# Patient Record
Sex: MALE | Race: WHITE | Employment: OTHER | URBAN - METROPOLITAN AREA
[De-identification: names, ages, dates, MRNs, and addresses within clinical notes are randomized per-mention and may not be internally consistent; named-entity substitution may affect disease eponyms.]

---

## 2018-07-05 ENCOUNTER — ED HISTORICAL/CONVERTED ENCOUNTER (OUTPATIENT)
Dept: OTHER | Age: 53
End: 2018-07-05

## 2019-12-26 ENCOUNTER — ED HISTORICAL/CONVERTED ENCOUNTER (OUTPATIENT)
Dept: OTHER | Age: 54
End: 2019-12-26

## 2019-12-30 ENCOUNTER — OFFICE VISIT (OUTPATIENT)
Dept: FAMILY MEDICINE CLINIC | Age: 54
End: 2019-12-30

## 2019-12-30 ENCOUNTER — HOSPITAL ENCOUNTER (OUTPATIENT)
Dept: LAB | Age: 54
Discharge: HOME | End: 2019-12-30

## 2019-12-30 VITALS
TEMPERATURE: 98.2 F | WEIGHT: 202 LBS | DIASTOLIC BLOOD PRESSURE: 68 MMHG | RESPIRATION RATE: 18 BRPM | SYSTOLIC BLOOD PRESSURE: 114 MMHG | HEART RATE: 60 BPM | HEIGHT: 68 IN | OXYGEN SATURATION: 98 % | BODY MASS INDEX: 30.62 KG/M2

## 2019-12-30 DIAGNOSIS — M79.641 BILATERAL HAND PAIN: ICD-10-CM

## 2019-12-30 DIAGNOSIS — I10 ESSENTIAL HYPERTENSION: ICD-10-CM

## 2019-12-30 DIAGNOSIS — G25.81 RESTLESS LEG SYNDROME: ICD-10-CM

## 2019-12-30 DIAGNOSIS — E78.00 HYPERCHOLESTEROLEMIA: ICD-10-CM

## 2019-12-30 DIAGNOSIS — E11.40 TYPE 2 DIABETES MELLITUS WITH DIABETIC NEUROPATHY, WITHOUT LONG-TERM CURRENT USE OF INSULIN (HCC): ICD-10-CM

## 2019-12-30 DIAGNOSIS — E11.40 TYPE 2 DIABETES MELLITUS WITH DIABETIC NEUROPATHY, WITHOUT LONG-TERM CURRENT USE OF INSULIN (HCC): Primary | ICD-10-CM

## 2019-12-30 DIAGNOSIS — M79.642 BILATERAL HAND PAIN: ICD-10-CM

## 2019-12-30 DIAGNOSIS — J40 BRONCHITIS: ICD-10-CM

## 2019-12-30 LAB
ALBUMIN SERPL-MCNC: 3.4 G/DL (ref 3.5–5)
ALBUMIN/GLOB SERPL: 1 {RATIO} (ref 1.1–2.2)
ALP SERPL-CCNC: 88 U/L (ref 45–117)
ALT SERPL-CCNC: 27 U/L (ref 12–78)
ANION GAP SERPL CALC-SCNC: 3 MMOL/L (ref 5–15)
AST SERPL-CCNC: 16 U/L (ref 15–37)
BASOPHILS # BLD: 0 K/UL (ref 0–0.1)
BASOPHILS NFR BLD: 0 % (ref 0–1)
BILIRUB SERPL-MCNC: 0.5 MG/DL (ref 0.2–1)
BUN SERPL-MCNC: 11 MG/DL (ref 6–20)
BUN/CREAT SERPL: 17 (ref 12–20)
CALCIUM SERPL-MCNC: 8.5 MG/DL (ref 8.5–10.1)
CHLORIDE SERPL-SCNC: 101 MMOL/L (ref 97–108)
CHOLEST SERPL-MCNC: 102 MG/DL
CO2 SERPL-SCNC: 32 MMOL/L (ref 21–32)
CREAT SERPL-MCNC: 0.66 MG/DL (ref 0.7–1.3)
DIFFERENTIAL METHOD BLD: ABNORMAL
EOSINOPHIL # BLD: 0 K/UL (ref 0–0.4)
EOSINOPHIL NFR BLD: 0 % (ref 0–7)
ERYTHROCYTE [DISTWIDTH] IN BLOOD BY AUTOMATED COUNT: 12.4 % (ref 11.5–14.5)
EST. AVERAGE GLUCOSE BLD GHB EST-MCNC: 160 MG/DL
GLOBULIN SER CALC-MCNC: 3.3 G/DL (ref 2–4)
GLUCOSE SERPL-MCNC: 213 MG/DL (ref 65–100)
HBA1C MFR BLD: 7.2 % (ref 4–5.6)
HCT VFR BLD AUTO: 37.6 % (ref 36.6–50.3)
HDLC SERPL-MCNC: 38 MG/DL
HDLC SERPL: 2.7 {RATIO} (ref 0–5)
HGB BLD-MCNC: 12.6 G/DL (ref 12.1–17)
IMM GRANULOCYTES # BLD AUTO: 0.1 K/UL (ref 0–0.04)
IMM GRANULOCYTES NFR BLD AUTO: 1 % (ref 0–0.5)
LDLC SERPL CALC-MCNC: 41.4 MG/DL (ref 0–100)
LIPID PROFILE,FLP: NORMAL
LYMPHOCYTES # BLD: 2.3 K/UL (ref 0.8–3.5)
LYMPHOCYTES NFR BLD: 13 % (ref 12–49)
MCH RBC QN AUTO: 30.9 PG (ref 26–34)
MCHC RBC AUTO-ENTMCNC: 33.5 G/DL (ref 30–36.5)
MCV RBC AUTO: 92.2 FL (ref 80–99)
MONOCYTES # BLD: 1.4 K/UL (ref 0–1)
MONOCYTES NFR BLD: 8 % (ref 5–13)
NEUTS SEG # BLD: 13.4 K/UL (ref 1.8–8)
NEUTS SEG NFR BLD: 78 % (ref 32–75)
NRBC # BLD: 0 K/UL (ref 0–0.01)
NRBC BLD-RTO: 0 PER 100 WBC
PLATELET # BLD AUTO: 265 K/UL (ref 150–400)
PMV BLD AUTO: 9.4 FL (ref 8.9–12.9)
POTASSIUM SERPL-SCNC: 3.5 MMOL/L (ref 3.5–5.1)
PROT SERPL-MCNC: 6.7 G/DL (ref 6.4–8.2)
RBC # BLD AUTO: 4.08 M/UL (ref 4.1–5.7)
SODIUM SERPL-SCNC: 136 MMOL/L (ref 136–145)
TRIGL SERPL-MCNC: 113 MG/DL (ref ?–150)
VLDLC SERPL CALC-MCNC: 22.6 MG/DL
WBC # BLD AUTO: 17.3 K/UL (ref 4.1–11.1)

## 2019-12-30 RX ORDER — PREDNISONE 20 MG/1
TABLET ORAL
COMMUNITY
Start: 2019-12-26 | End: 2020-02-11

## 2019-12-30 RX ORDER — IBUPROFEN 800 MG/1
TABLET ORAL
COMMUNITY
Start: 2019-10-04 | End: 2019-12-30 | Stop reason: ALTCHOICE

## 2019-12-30 RX ORDER — METOPROLOL TARTRATE 100 MG/1
TABLET ORAL
COMMUNITY
Start: 2019-10-25 | End: 2021-01-22

## 2019-12-30 RX ORDER — OMEPRAZOLE 20 MG/1
CAPSULE, DELAYED RELEASE ORAL
COMMUNITY
Start: 2019-12-13 | End: 2020-02-13 | Stop reason: DRUGHIGH

## 2019-12-30 RX ORDER — BUPRENORPHINE AND NALOXONE 8; 2 MG/1; MG/1
FILM, SOLUBLE BUCCAL; SUBLINGUAL 3 TIMES DAILY
COMMUNITY
Start: 2019-11-19

## 2019-12-30 RX ORDER — DOCUSATE SODIUM 100 MG/1
CAPSULE, LIQUID FILLED ORAL
COMMUNITY
Start: 2019-11-19 | End: 2020-10-27

## 2019-12-30 RX ORDER — GABAPENTIN 800 MG/1
800 TABLET ORAL
COMMUNITY
Start: 2019-11-19 | End: 2020-03-31 | Stop reason: SDUPTHER

## 2019-12-30 RX ORDER — ROSUVASTATIN CALCIUM 5 MG/1
5 TABLET, COATED ORAL
COMMUNITY
Start: 2019-09-18 | End: 2021-01-22

## 2019-12-30 RX ORDER — ALBUTEROL SULFATE 90 UG/1
AEROSOL, METERED RESPIRATORY (INHALATION)
COMMUNITY
Start: 2019-12-26 | End: 2021-06-30

## 2019-12-30 RX ORDER — DOXYCYCLINE 100 MG/1
CAPSULE ORAL
COMMUNITY
Start: 2019-12-27 | End: 2020-02-11

## 2019-12-30 RX ORDER — HYDROCHLOROTHIAZIDE 12.5 MG/1
12.5 CAPSULE ORAL
COMMUNITY
Start: 2019-10-25 | End: 2021-01-22

## 2019-12-30 RX ORDER — AMOXICILLIN 250 MG
CAPSULE ORAL
COMMUNITY
Start: 2019-10-25 | End: 2021-09-29

## 2019-12-30 RX ORDER — MELOXICAM 7.5 MG/1
7.5-15 TABLET ORAL DAILY
Qty: 60 TAB | Refills: 2 | Status: SHIPPED | OUTPATIENT
Start: 2019-12-30 | End: 2020-02-11

## 2019-12-30 RX ORDER — METFORMIN HYDROCHLORIDE 500 MG/1
500 TABLET ORAL
COMMUNITY
Start: 2019-11-19 | End: 2020-01-30

## 2019-12-30 NOTE — PROGRESS NOTES
Chief Complaint   Patient presents with   [address]     Patient in office today to est care. Last meal was last night. Pt previous est care Dr.Jolanta A. [place] Phalen    Pt has a long history of RLS. Has completed sleep studies in the past.   Pt somehow ended up on suboxone while in [facility] which was the only thing that helped him. Eventually after being released from [facility] pt was prescribed percocet for his RLS which helped him. His PCP changed and the new provider refused to prescribe him percocet. Pt eventually went to a suboxone clinic. Only thing that has helped his RLS at night. Pt had to go to therapy once a week and sees the doctor every 2-3 weeks. Getting a good night sleep with use of suboxone before bedtime. Has not had his sugar checked in the last few months. Has had a cold on and off for the last few weeks. Recent use of abx for his cough and congestion. Just had an MRI done on right shoulder. Going to have EMG testing done in left hand. Waking up with stiffness in the hands. Just started using blue emu which has helped so far. This was also ordered by same ortho doc. Taking neurontin BID daily for his neuropathy. Denies any other concerns at this time. Chief Complaint   Patient presents with   [address]     he is a 47y.o. year old male who presents for evalution. Reviewed PmHx, RxHx, FmHx, SocHx, AllgHx and updated and dated in the chart.     Review of Systems - negative except as listed above in the HPI    Objective:     Vitals:    12/30/19 1444   BP: 114/68   Pulse: 60   Resp: 18   Temp: 98.2 °F (36.8 °C)   TempSrc: Oral   SpO2: 98%   Weight: 202 lb (91.6 kg)   Height: 5' 8\" (1.727 m)     Physical Examination: General appearance - alert, well appearing, and in no distress  Mental status - normal mood, behavior, speech, dress, motor activity, and thought processes  Eyes - pupils equal and reactive, extraocular eye movements intact  Ears - bilateral TM's and external ear canals normal  Nose - normal and patent, no erythema, discharge or polyps and normal nontender sinuses  Mouth - mucous membranes moist, pharynx normal without lesions  Neck - supple, no significant adenopathy, carotids upstroke normal bilaterally, no bruits, thyroid exam: thyroid is normal in size without nodules or tenderness  Chest - clear to auscultation, no wheezes, rales or rhonchi, symmetric air entry  Heart - normal rate, regular rhythm, normal S1, S2, no murmurs  Extremities - peripheral pulses normal, no edema, no clubbing or cyanosis  Skin - normal coloration and turgor, no rashes, no suspicious skin lesions noted    Assessment/ Plan:   Diagnoses and all orders for this visit:    1. Type 2 diabetes mellitus with diabetic neuropathy, without long-term current use of insulin (HCC)  -     HEMOGLOBIN A1C WITH EAG; Future  Will notify results and deviate plan based on findings. 2. Essential hypertension  -     METABOLIC PANEL, COMPREHENSIVE; Future  -     CBC WITH AUTOMATED DIFF; Future  BP at goal on med regimen. 3. Hypercholesterolemia  -     LIPID PANEL; Future  Will notify results and deviate plan based on findings. Continue crestor daily as prescribed. 4. Restless leg syndrome  Doing well on suboxone for this. Will need to continue to see specialist.   5. Bronchitis  Complete doxy and prednisone as directed. Recently started meds. 6. Bilateral hand pain  -     meloxicam (MOBIC) 7.5 mg tablet; Take 1-2 Tabs by mouth daily. Start mobic as directed. Reviewed SEs/ADRs of medication. Reviewed other supportive measures. Follow-up and Dispositions    · Return if symptoms worsen or fail to improve. I have discussed the diagnosis with the patient and the intended plan as seen in the above orders. The patient has received an after-visit summary and questions were answered concerning future plans.      Medication Side Effects and Warnings were discussed with patient: yes  Patient Labs were reviewed and or requested: yes  Patient Past Records were reviewed and or requested  yes  Patient / Caregiver Understanding of treatment plan was verbalized during office visit YES    TU Bunn    There are no Patient Instructions on file for this visit.

## 2019-12-30 NOTE — PROGRESS NOTES
Chief Complaint   Patient presents with   [address]     Patient in office today to est care. Pt previous est care Dr.Jolanta CLAIR Khan      Pt have no concerns. 1. Have you been to the ER, urgent care clinic since your last visit? Hospitalized since your last visit? No    2. Have you seen or consulted any other health care providers outside of the [address] [place] [address] since your last visit? Include any pap smears or colon screening.  No

## 2020-01-02 ENCOUNTER — TELEPHONE (OUTPATIENT)
Dept: FAMILY MEDICINE CLINIC | Age: 55
End: 2020-01-02

## 2020-01-02 NOTE — PROGRESS NOTES
Please notify pt the followin. Diabetes pretty well controlled. Enc to continue med regimen and follow up every 3 months for routine lab work to monitor closely. 2. His WBC count is very elevated which is likely due to his bad bronchitis. Enc pt to complete entire course of abx prescribed by urgent care. I recommend he follow up in 2 weeks to recheck CBC to make sure we see improvement after finishing meds.

## 2020-01-30 ENCOUNTER — TELEPHONE (OUTPATIENT)
Dept: FAMILY MEDICINE CLINIC | Age: 55
End: 2020-01-30

## 2020-01-30 DIAGNOSIS — E11.40 TYPE 2 DIABETES MELLITUS WITH DIABETIC NEUROPATHY, WITHOUT LONG-TERM CURRENT USE OF INSULIN (HCC): Primary | ICD-10-CM

## 2020-01-30 RX ORDER — METFORMIN HYDROCHLORIDE 500 MG/1
1000 TABLET ORAL 2 TIMES DAILY WITH MEALS
Qty: 120 TAB | Refills: 5 | Status: SHIPPED | OUTPATIENT
Start: 2020-01-30 | End: 2020-03-31 | Stop reason: SDUPTHER

## 2020-01-30 NOTE — TELEPHONE ENCOUNTER
Patient would like a refill on metformin 500mg that has not been refilled here yet. He has been taking 4 tabs a day instead of 2 per previous pcp.

## 2020-02-11 ENCOUNTER — OFFICE VISIT (OUTPATIENT)
Dept: FAMILY MEDICINE CLINIC | Age: 55
End: 2020-02-11

## 2020-02-11 ENCOUNTER — DOCUMENTATION ONLY (OUTPATIENT)
Dept: FAMILY MEDICINE CLINIC | Age: 55
End: 2020-02-11

## 2020-02-11 ENCOUNTER — HOSPITAL ENCOUNTER (OUTPATIENT)
Dept: LAB | Age: 55
Discharge: HOME | End: 2020-02-11

## 2020-02-11 VITALS
SYSTOLIC BLOOD PRESSURE: 126 MMHG | TEMPERATURE: 97.6 F | RESPIRATION RATE: 18 BRPM | HEIGHT: 68 IN | BODY MASS INDEX: 31.22 KG/M2 | HEART RATE: 60 BPM | OXYGEN SATURATION: 95 % | WEIGHT: 206 LBS | DIASTOLIC BLOOD PRESSURE: 78 MMHG

## 2020-02-11 DIAGNOSIS — D72.829 LEUKOCYTOSIS, UNSPECIFIED TYPE: Primary | ICD-10-CM

## 2020-02-11 DIAGNOSIS — M79.642 BILATERAL HAND PAIN: ICD-10-CM

## 2020-02-11 DIAGNOSIS — M79.641 BILATERAL HAND PAIN: ICD-10-CM

## 2020-02-11 DIAGNOSIS — D72.829 LEUKOCYTOSIS, UNSPECIFIED TYPE: ICD-10-CM

## 2020-02-11 DIAGNOSIS — M15.9 OSTEOARTHRITIS INVOLVING MULTIPLE JOINTS ON BOTH SIDES OF BODY: ICD-10-CM

## 2020-02-11 LAB
BASOPHILS # BLD: 0.1 K/UL (ref 0–0.1)
BASOPHILS NFR BLD: 1 % (ref 0–1)
DIFFERENTIAL METHOD BLD: ABNORMAL
EOSINOPHIL # BLD: 0.6 K/UL (ref 0–0.4)
EOSINOPHIL NFR BLD: 8 % (ref 0–7)
ERYTHROCYTE [DISTWIDTH] IN BLOOD BY AUTOMATED COUNT: 13.6 % (ref 11.5–14.5)
HCT VFR BLD AUTO: 41 % (ref 36.6–50.3)
HGB BLD-MCNC: 13.4 G/DL (ref 12.1–17)
IMM GRANULOCYTES # BLD AUTO: 0 K/UL (ref 0–0.04)
IMM GRANULOCYTES NFR BLD AUTO: 0 % (ref 0–0.5)
LYMPHOCYTES # BLD: 1.7 K/UL (ref 0.8–3.5)
LYMPHOCYTES NFR BLD: 21 % (ref 12–49)
MCH RBC QN AUTO: 31.2 PG (ref 26–34)
MCHC RBC AUTO-ENTMCNC: 32.7 G/DL (ref 30–36.5)
MCV RBC AUTO: 95.3 FL (ref 80–99)
MONOCYTES # BLD: 1 K/UL (ref 0–1)
MONOCYTES NFR BLD: 12 % (ref 5–13)
NEUTS SEG # BLD: 4.8 K/UL (ref 1.8–8)
NEUTS SEG NFR BLD: 58 % (ref 32–75)
NRBC # BLD: 0 K/UL (ref 0–0.01)
NRBC BLD-RTO: 0 PER 100 WBC
PLATELET # BLD AUTO: 187 K/UL (ref 150–400)
PMV BLD AUTO: 10.4 FL (ref 8.9–12.9)
RBC # BLD AUTO: 4.3 M/UL (ref 4.1–5.7)
WBC # BLD AUTO: 8.1 K/UL (ref 4.1–11.1)

## 2020-02-11 RX ORDER — MELOXICAM 15 MG/1
15 TABLET ORAL DAILY
Qty: 90 TAB | Refills: 1 | Status: SHIPPED | OUTPATIENT
Start: 2020-02-11 | End: 2020-03-31 | Stop reason: ALTCHOICE

## 2020-02-11 NOTE — PROGRESS NOTES
Sherif Dukes [facility] @ [phone]. Confirmation number [id number]. Original form placed in scan folder for central scanning.

## 2020-02-11 NOTE — PROGRESS NOTES
Chief Complaint   Patient presents with    Labs    Hand Pain     Patient in office today for recheck on cbc. Pt have c/o of hand pain that has not improved since lov. Pt states mobic has slightly improved pain. Pt states middle,index,and pointer are the worse fingers. Pt have noted limited ROM and dropping objects. Pt states pain is worse in the morning. Pt denies family hx of arthritis. 1. Have you been to the ER, urgent care clinic since your last visit? Hospitalized since your last visit? No    2. Have you seen or consulted any other health care providers outside of the [facility] since your last visit? Include any pap smears or colon screening.  No

## 2020-02-11 NOTE — PROGRESS NOTES
Chief Complaint   Patient presents with    Labs    Hand Pain     Patient in office today for recheck on cbc. Pt have c/o of hand pain that has not improved since lov. Pt states mobic has slightly improved pain. Pt states middle,index,and pointer are the worse fingers. Pt have noted limited ROM and dropping objects. Pt states pain is worse in the morning. Stiffness also worse in the morning. Has been taking the mobic 2 per day every day. Was using blue emu due to his dog biting him and having an open sore. Pt denies family hx of arthritis. Denies any other concerns at this time. Chief Complaint   Patient presents with    Labs    Hand Pain     he is a 47y.o. year old male who presents for evalution. Reviewed PmHx, RxHx, FmHx, SocHx, AllgHx and updated and dated in the chart. Review of Systems - negative except as listed above in the HPI    Objective:     Vitals:    02/11/20 1128   BP: 126/78   Pulse: 60   Resp: 18   Temp: 97.6 °F (36.4 °C)   TempSrc: Oral   SpO2: 95%   Weight: 206 lb (93.4 kg)   Height: 5' 8\" (1.727 m)     Physical Examination: General appearance - alert, well appearing, and in no distress  Mental status - normal mood, behavior, speech, dress, motor activity, and thought processes  Chest - clear to auscultation, no wheezes, rales or rhonchi, symmetric air entry  Heart - normal rate, regular rhythm, normal S1, S2, no murmurs  Musculoskeletal - osteoarthritic changes noted in both hands    Assessment/ Plan:   Diagnoses and all orders for this visit:    1. Leukocytosis, unspecified type  -     CBC WITH AUTOMATED DIFF; Future  Will notify results and deviate plan based on findings. Denies any persistent sx after completing abx.   2. Bilateral hand pain / 3. Osteoarthritis involving multiple joints on both sides of body  -     REFERRAL TO ORTHOPEDICS  -     meloxicam (MOBIC) 15 mg tablet;  Take 1 Tab by mouth daily.  -     OTHER; Diclofenac / Gabapentin in Lidocaine / Prilocaine 2.5%/2.5%/2.6% cream.  Use as directed. Continue mobic daily. Reviewed other supportive and conservative measures. Enc pt to consider seeing ortho for further evaluation if sx persist or worsen. I have discussed the diagnosis with the patient and the intended plan as seen in the above orders. The patient has received an after-visit summary and questions were answered concerning future plans. Medication Side Effects and Warnings were discussed with patient: yes  Patient Labs were reviewed and or requested: yes  Patient Past Records were reviewed and or requested  yes  Patient / Caregiver Understanding of treatment plan was verbalized during office visit YES    TU Garcia    There are no Patient Instructions on file for this visit.

## 2020-02-12 NOTE — PROGRESS NOTES
Please notify pt that his WBC count has normalized. No evidence of persistent infection and no additional work up indicated at this time.

## 2020-02-13 ENCOUNTER — TELEPHONE (OUTPATIENT)
Dept: FAMILY MEDICINE CLINIC | Age: 55
End: 2020-02-13

## 2020-02-13 RX ORDER — PHENOL/SODIUM PHENOLATE
20 AEROSOL, SPRAY (ML) MUCOUS MEMBRANE 2 TIMES DAILY
Qty: 60 TAB | Refills: 5 | Status: SHIPPED | OUTPATIENT
Start: 2020-02-13 | End: 2020-09-03 | Stop reason: SDUPTHER

## 2020-02-13 NOTE — TELEPHONE ENCOUNTER
Please refer to result note. Pt stated he wanted to notify provider that he taking omeprazole 20mg bid.

## 2020-03-31 ENCOUNTER — VIRTUAL VISIT (OUTPATIENT)
Dept: FAMILY MEDICINE CLINIC | Age: 55
End: 2020-03-31

## 2020-03-31 DIAGNOSIS — G56.00 CARPAL TUNNEL SYNDROME, UNSPECIFIED LATERALITY: ICD-10-CM

## 2020-03-31 DIAGNOSIS — M19.041 OSTEOARTHRITIS OF BOTH HANDS, UNSPECIFIED OSTEOARTHRITIS TYPE: Primary | ICD-10-CM

## 2020-03-31 DIAGNOSIS — E11.40 TYPE 2 DIABETES MELLITUS WITH DIABETIC NEUROPATHY, WITHOUT LONG-TERM CURRENT USE OF INSULIN (HCC): ICD-10-CM

## 2020-03-31 DIAGNOSIS — I10 ESSENTIAL HYPERTENSION: ICD-10-CM

## 2020-03-31 DIAGNOSIS — M19.042 OSTEOARTHRITIS OF BOTH HANDS, UNSPECIFIED OSTEOARTHRITIS TYPE: Primary | ICD-10-CM

## 2020-03-31 DIAGNOSIS — E78.00 HYPERCHOLESTEROLEMIA: ICD-10-CM

## 2020-03-31 RX ORDER — GABAPENTIN 800 MG/1
800 TABLET ORAL 3 TIMES DAILY
Qty: 90 TAB | Refills: 3 | Status: SHIPPED | OUTPATIENT
Start: 2020-03-31 | End: 2020-08-14

## 2020-03-31 RX ORDER — CELECOXIB 200 MG/1
200 CAPSULE ORAL DAILY
Qty: 30 CAP | Refills: 2 | Status: SHIPPED | OUTPATIENT
Start: 2020-03-31 | End: 2020-07-10

## 2020-03-31 RX ORDER — METFORMIN HYDROCHLORIDE 500 MG/1
1000 TABLET ORAL 2 TIMES DAILY WITH MEALS
Qty: 120 TAB | Refills: 5 | Status: SHIPPED | OUTPATIENT
Start: 2020-03-31 | End: 2020-12-28 | Stop reason: SDUPTHER

## 2020-03-31 NOTE — PROGRESS NOTES
Glenda Gil is a 47 y.o. male who was seen by synchronous (real-time) audio-video technology on 3/31/2020. Consent:  He and/or his healthcare decision maker is aware that this patient-initiated Telehealth encounter is a billable service, with coverage as determined by his insurance carrier. He is aware that he may receive a bill and has provided verbal consent to proceed: Yes    I was in the office while conducting this encounter. Pt reports persistent bilateral hand pain. Pt was found to have CTS and surgery scheduled on 4/15 with Dr. Dorn Olszewski was postponed due to covid19 pandemic. Has been taking mobic daily without relief. Persistent stiffness and pain of the hands, francy first thing in the morning. Using cream that was prescribed through compound[facility] pharmacy [facility] which has helped. Taking the 800 mg gabapentin in the morning only. Has not tried increasing frequency. Would like to try alt medication for pain and stiffness. Pt also due to update his a1c. Taking all medications daily as prescribed. Willing to RTC for fasting labs only. Denies any other concerns at this time. Assessment & Plan:   Diagnoses and all orders for this visit:    1. Osteoarthritis of both hands, unspecified osteoarthritis type  -     celecoxib (CELEBREX) 200 mg capsule; Take 1 Cap by mouth daily for 90 days. Stop mobic. Start celebrex daily as prescribed. Reviewed SEs/ADRs of medication. Continue prilosec daily due to history of GERD. Continue to follow up with specialist as advised. 2. Carpal tunnel syndrome, unspecified laterality  -     gabapentin (NEURONTIN) 800 mg tablet; Take 1 Tab by mouth three (3) times daily. Max Daily Amount: 2,400 mg. Recommended pt try to increase to BID dosing and take second dose at bedtime to help with morning sx. Okay to increase to TID dosing if needed. Reinforced SEs/ADRs of medication.    3. Essential hypertension  BP was normal last 2 OVs. Continue current med regimen as prescribed. 4. Hypercholesterolemia  -     LIPID PANEL; Future  Will notify results and deviate plan based on findings. Continue crestor daily as prescribed. 5. Type 2 diabetes mellitus with diabetic neuropathy, without long-term current use of insulin (HCC)  -     HEMOGLOBIN A1C WITH EAG; Future  -     METABOLIC PANEL, COMPREHENSIVE; Future  -     CBC WITH AUTOMATED DIFF; Future  Will notify results and deviate plan based on findings. Last a1c was 7.2 in December. Coding Help - Use CPT Codes 27615-98466, 04150-41709 for Established and New Patients respectively, either employing EM elements or Time rules. Other codes (example consult codes) may also apply. I spent at least 15 minutes with this established patient, and >50% of the time was spent counseling and/or coordinating care regarding hand pain and diabetes. 712  Subjective:   [address] was seen for No chief complaint on file. Prior to Admission medications    Medication Sig Start Date End Date Taking? Authorizing Provider   gabapentin (NEURONTIN) 800 mg tablet Take 1 Tab by mouth three (3) times daily. Max Daily Amount: 2,400 mg. 3/31/20  Yes Ginna Lau NP   celecoxib (CELEBREX) 200 mg capsule Take 1 Cap by mouth daily for 90 days. 3/31/20 6/29/20 Yes Ginna Lau NP   Omeprazole delayed release (PRILOSEC D/R) 20 mg tablet Take 1 Tab by mouth two (2) times a day. 2/13/20   Ginna Lau NP   OTHER Diclofenac / Gabapentin in Lidocaine / Prilocaine 2.5%/2.5%/2.6% cream. 2/11/20   Ginna Lau NP   meloxicam (MOBIC) 15 mg tablet Take 1 Tab by mouth daily. 2/11/20 3/31/20  Ginna Lau NP   metFORMIN (GLUCOPHAGE) 500 mg tablet Take 2 Tabs by mouth two (2) times daily (with meals).  1/30/20   Ginna Lau NP   albuterol (PROVENTIL HFA, VENTOLIN HFA, PROAIR HFA) 90 mcg/actuation inhaler INHALE 1 PUFF BY MOUTH EVERY 4 HOURS AS NEEDED FOR WHEEZING 12/26/19   Provider, Historical   buprenorphine-naloxone (SUBOXONE) 8-2 mg film sublingaul film dissolve 1 film under the tongue TWICE DAILY 11/19/19   Provider, Historical   docusate sodium (DOK) 100 mg capsule TAKE ONE CAPSULE BY MOUTH ONCE DAILY AS NEEDED 11/19/19   Provider, Historical   hydroCHLOROthiazide (MICROZIDE) 12.5 mg capsule Take 12.5 mg by mouth. 10/25/19   Provider, Historical   metoprolol tartrate (LOPRESSOR) 100 mg IR tablet TAKE ONE TABLET BY MOUTH EVERY DAY 10/25/19   Provider, Historical   rosuvastatin (CRESTOR) 5 mg tablet Take 5 mg by mouth. 9/18/19   Provider, Historical   senna-docusate (PERICOLACE) 8.6-50 mg per tablet take 2 tablets BY MOUTH EVERY EVENING AS NEEDED 10/25/19   Provider, Historical   gabapentin (NEURONTIN) 800 mg tablet Take 800 mg by mouth. 11/19/19 3/31/20  Provider, Historical     Allergies   Allergen Reactions    Aspirin Other (comments)     GI Upset    Pcn [Penicillins] Unknown (comments)     Family reported. Patient Active Problem List   Diagnosis Code    Restless leg syndrome G25.81    Hypertension I10    Hypercholesterolemia E78.00    Type 2 diabetes mellitus with diabetic neuropathy (HCC) E11.40    GERD (gastroesophageal reflux disease) K21.9     Patient Active Problem List    Diagnosis Date Noted    Restless leg syndrome     Hypertension     Hypercholesterolemia     Type 2 diabetes mellitus with diabetic neuropathy (HCC)     GERD (gastroesophageal reflux disease)      Current Outpatient Medications   Medication Sig Dispense Refill    gabapentin (NEURONTIN) 800 mg tablet Take 1 Tab by mouth three (3) times daily. Max Daily Amount: 2,400 mg. 90 Tab 3    celecoxib (CELEBREX) 200 mg capsule Take 1 Cap by mouth daily for 90 days. 30 Cap 2    Omeprazole delayed release (PRILOSEC D/R) 20 mg tablet Take 1 Tab by mouth two (2) times a day.  60 Tab 5    OTHER Diclofenac / Gabapentin in Lidocaine / Prilocaine 2.5%/2.5%/2.6% cream. 240 g 3    metFORMIN (GLUCOPHAGE) 500 mg tablet Take 2 Tabs by mouth two (2) times daily (with meals). 120 Tab 5    albuterol (PROVENTIL HFA, VENTOLIN HFA, PROAIR HFA) 90 mcg/actuation inhaler INHALE 1 PUFF BY MOUTH EVERY 4 HOURS AS NEEDED FOR WHEEZING      buprenorphine-naloxone (SUBOXONE) 8-2 mg film sublingaul film dissolve 1 film under the tongue TWICE DAILY      docusate sodium (DOK) 100 mg capsule TAKE ONE CAPSULE BY MOUTH ONCE DAILY AS NEEDED      hydroCHLOROthiazide (MICROZIDE) 12.5 mg capsule Take 12.5 mg by mouth.  metoprolol tartrate (LOPRESSOR) 100 mg IR tablet TAKE ONE TABLET BY MOUTH EVERY DAY      rosuvastatin (CRESTOR) 5 mg tablet Take 5 mg by mouth.  senna-docusate (PERICOLACE) 8.6-50 mg per tablet take 2 tablets BY MOUTH EVERY EVENING AS NEEDED       Allergies   Allergen Reactions    Aspirin Other (comments)     GI Upset    Pcn [Penicillins] Unknown (comments)     Family reported. Past Medical History:   Diagnosis Date    Acid reflux     Diabetes ([facility] [address]     Diabetic neuropathy (HCC)     Hypercholesterolemia     Hypertension     Restless leg syndrome      No past surgical history on file. Family History   Problem Relation Age of Onset    Diabetes Mother     Heart Disease Mother     Heart Disease Father      Social History     Tobacco Use    Smoking status: Current Every Day Smoker     Packs/day: 1.00    Smokeless tobacco: Never Used   Substance Use Topics    Alcohol use: Not Currently       ROS    PHYSICAL EXAMINATION:  [ INSTRUCTIONS:  \"[x]\" Indicates a positive item  \"[]\" Indicates a negative item  -- DELETE ALL ITEMS NOT EXAMINED]  Vital Signs: (As obtained by patient/caregiver at home)  There were no vitals taken for this visit.      Constitutional: [x] Appears well-developed and well-nourished [x] No apparent distress      [] Abnormal -     Mental status: [x] Alert and awake  [x] Oriented to person/place/time [x] Able to follow commands    [] Abnormal -     Eyes:   EOM    [x]  Normal    [] Abnormal -   Sclera  [x]  Normal    [] Abnormal -          Discharge [x]  None visible   [] Abnormal -     HENT: [x] Normocephalic, atraumatic  [] Abnormal -   [x] Mouth/Throat: Mucous membranes are moist    External Ears [x] Normal  [] Abnormal -    Neck: [x] No visualized mass [] Abnormal -     Pulmonary/Chest: [x] Respiratory effort normal   [x] No visualized signs of difficulty breathing or respiratory distress        [] Abnormal -      Musculoskeletal:   [x] Normal gait with no signs of ataxia         [x] Normal range of motion of neck        [] Abnormal -     Neurological:        [x] No Facial Asymmetry (Cranial nerve 7 motor function) (limited exam due to video visit)          [x] No gaze palsy        [] Abnormal -          Skin:        [x] No significant exanthematous lesions or discoloration noted on facial skin         [] Abnormal -            Psychiatric:       [x] Normal Affect [] Abnormal -        [x] No Hallucinations    Other pertinent observable physical exam findings:-        We discussed the expected course, resolution and complications of the diagnosis(es) in detail. Medication risks, benefits, costs, interactions, and alternatives were discussed as indicated. I advised him to contact the office if his condition worsens, changes or fails to improve as anticipated. He expressed understanding with the diagnosis(es) and plan. Pursuant to the emergency declaration under the [facility], [facility] waiver authority and the [employer] Act, this Virtual  Visit was conducted, with patient's consent, to reduce the patient's risk of exposure to COVID-19 and provide continuity of care for an established patient. Services were provided through a video synchronous discussion virtually to substitute for in-person clinic visit.     Gunnar Hernandez NP

## 2020-04-08 ENCOUNTER — OFFICE VISIT (OUTPATIENT)
Dept: PRIMARY CARE CLINIC | Age: 55
End: 2020-04-08

## 2020-04-08 VITALS — TEMPERATURE: 98.6 F | RESPIRATION RATE: 22 BRPM | OXYGEN SATURATION: 90 % | HEART RATE: 75 BPM

## 2020-04-08 DIAGNOSIS — R05.9 COUGH: ICD-10-CM

## 2020-04-08 DIAGNOSIS — J40 BRONCHITIS: Primary | ICD-10-CM

## 2020-04-08 DIAGNOSIS — R50.9 FEBRILE ILLNESS: ICD-10-CM

## 2020-04-08 LAB
FLUAV+FLUBV AG NOSE QL IA.RAPID: NEGATIVE POS/NEG
FLUAV+FLUBV AG NOSE QL IA.RAPID: NEGATIVE POS/NEG
VALID INTERNAL CONTROL?: YES

## 2020-04-08 RX ORDER — GUAIFENESIN 600 MG/1
600 TABLET, EXTENDED RELEASE ORAL
Qty: 30 TAB | Refills: 0 | Status: SHIPPED | OUTPATIENT
Start: 2020-04-08 | End: 2021-01-22

## 2020-04-08 RX ORDER — CEFDINIR 300 MG/1
300 CAPSULE ORAL 2 TIMES DAILY
Qty: 20 CAP | Refills: 0 | Status: SHIPPED | OUTPATIENT
Start: 2020-04-08 | End: 2020-04-18

## 2020-06-02 ENCOUNTER — VIRTUAL VISIT (OUTPATIENT)
Dept: FAMILY MEDICINE CLINIC | Age: 55
End: 2020-06-02

## 2020-06-02 DIAGNOSIS — G56.00 CARPAL TUNNEL SYNDROME, UNSPECIFIED LATERALITY: ICD-10-CM

## 2020-06-02 DIAGNOSIS — I10 ESSENTIAL HYPERTENSION: ICD-10-CM

## 2020-06-02 DIAGNOSIS — E78.00 HYPERCHOLESTEROLEMIA: ICD-10-CM

## 2020-06-02 DIAGNOSIS — M13.0 POLYARTHRITIS: ICD-10-CM

## 2020-06-02 DIAGNOSIS — E11.40 TYPE 2 DIABETES MELLITUS WITH DIABETIC NEUROPATHY, WITHOUT LONG-TERM CURRENT USE OF INSULIN (HCC): Primary | ICD-10-CM
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
## 2020-06-02 DIAGNOSIS — S61.451A DOG BITE, HAND, RIGHT, INITIAL ENCOUNTER: ICD-10-CM

## 2020-06-02 RX ORDER — DOXYCYCLINE 100 MG/1
100 TABLET ORAL 2 TIMES DAILY
Qty: 20 TAB | Refills: 0 | Status: SHIPPED | OUTPATIENT
Start: 2020-06-02 | End: 2020-06-12

## 2020-06-02 NOTE — PROGRESS NOTES
Charisse Elizabeth is a 47 y.o. male who was seen by synchronous (real-time) audio-video technology on 6/2/2020. Consent: Charisse Fang, who was seen by synchronous (real-time) audio-video technology, and/or his healthcare decision maker, is aware that this patient-initiated, Telehealth encounter on 6/2/2020 is a billable service, with coverage as determined by his insurance carrier. He is aware that he may receive a bill and has provided verbal consent to proceed: Yes. Assessment & Plan:   Diagnoses and all orders for this visit:    1. Type 2 diabetes mellitus with diabetic neuropathy, without long-term current use of insulin (HCC)  -     HEMOGLOBIN A1C WITH EAG; Future  Will notify results and deviate plan based on findings. 2. Carpal tunnel syndrome, unspecified laterality  Discussed that he is already on a high dose of the gabapentin. Recommended he continue with plan to see the hand specialist next week and see what they recommend for management of ongoing sx. 3. Essential hypertension  -     METABOLIC PANEL, COMPREHENSIVE; Future  -     CBC WITH AUTOMATED DIFF; Future  Continue BP med regimen as prescribed. 4. Hypercholesterolemia  -     LIPID PANEL; Future  Will notify results and deviate plan based on findings. 5. Dog bite, hand, right, initial encounter  -     doxycycline (ADOXA) 100 mg tablet; Take 1 Tab by mouth two (2) times a day for 10 days. Complete as directed. Enc to keep clean and reviewed s/sx of infection that warrant prompt follow up or more immediate medical attention. Pt also has an appt with hand specialist next week. 6. Polyarthritis  -     URIC ACID; Future  -     RHEUMATOID FACTOR, QT; Future  Will notify results and deviate plan based on findings. I spent at least 20 minutes on this visit with this established patient.   [id number]  Subjective:   Charisse Elizabeth is a 47 y.o. male who was seen for Medication Refill    Pt states that increasing the gabapentin has helped his neuropathy symptoms. Still having issues with aching in bilateral hands. Had an appt with hand specialist this morning. Bit by a dog yesterday so he had to miss his appointment. Was told that dog is up to date on shots. Applied abx ointment after cleaning with peroxide. Swelling present. Denies any recent abx. Still having problems with waking up in the morning with stiffness and difficulty closing his hands. Had to reschedule the appt to next Tuesday. Pt never returned for lab work after last [address] in March. Denies any other concerns at this time. Prior to Admission medications    Medication Sig Start Date End Date Taking? Authorizing Provider   guaiFENesin ER (MUCINEX) 600 mg ER tablet Take 1 Tab by mouth two (2) times daily as needed for Congestion. 4/8/20  Yes Maximus Kurtz NP   metFORMIN (GLUCOPHAGE) 500 mg tablet Take 2 Tabs by mouth two (2) times daily (with meals). 3/31/20  Yes Simona Austin NP   gabapentin (NEURONTIN) 800 mg tablet Take 1 Tab by mouth three (3) times daily. Max Daily Amount: 2,400 mg. 3/31/20  Yes Simona Austin NP   celecoxib (CELEBREX) 200 mg capsule Take 1 Cap by mouth daily for 90 days. 3/31/20 6/29/20 Yes Simona Austin NP   Omeprazole delayed release (PRILOSEC D/R) 20 mg tablet Take 1 Tab by mouth two (2) times a day.  2/13/20  Yes Simona Austin NP   OTHER Diclofenac / Gabapentin in Lidocaine / Prilocaine 2.5%/2.5%/2.6% cream. 2/11/20  Yes Simona Austin NP   albuterol (PROVENTIL HFA, VENTOLIN HFA, PROAIR HFA) 90 mcg/actuation inhaler INHALE 1 PUFF BY MOUTH EVERY 4 HOURS AS NEEDED FOR WHEEZING 12/26/19  Yes Provider, Historical   buprenorphine-naloxone (SUBOXONE) 8-2 mg film sublingaul film dissolve 1 film under the tongue TWICE DAILY 11/19/19  Yes Provider, Historical   docusate sodium (DOK) 100 mg capsule TAKE ONE CAPSULE BY MOUTH ONCE DAILY AS NEEDED 11/19/19  Yes Provider, Historical   hydroCHLOROthiazide (MICROZIDE) 12.5 mg capsule Take 12.5 mg by mouth. 10/25/19  Yes Provider, Historical   metoprolol tartrate (LOPRESSOR) 100 mg IR tablet TAKE ONE TABLET BY MOUTH EVERY DAY 10/25/19  Yes Provider, Historical   rosuvastatin (CRESTOR) 5 mg tablet Take 5 mg by mouth. 9/18/19  Yes Provider, Historical   senna-docusate (PERICOLACE) 8.6-50 mg per tablet take 2 tablets BY MOUTH EVERY EVENING AS NEEDED 10/25/19  Yes Provider, Historical     Allergies   Allergen Reactions    Aspirin Other (comments)     GI Upset    Pcn [Penicillins] Unknown (comments)     Family reported. Patient Active Problem List    Diagnosis Date Noted    Restless leg syndrome     Hypertension     Hypercholesterolemia     Type 2 diabetes mellitus with diabetic neuropathy (HCC)     GERD (gastroesophageal reflux disease)      Current Outpatient Medications   Medication Sig Dispense Refill    doxycycline (ADOXA) 100 mg tablet Take 1 Tab by mouth two (2) times a day for 10 days. 20 Tab 0    guaiFENesin ER (MUCINEX) 600 mg ER tablet Take 1 Tab by mouth two (2) times daily as needed for Congestion. 30 Tab 0    metFORMIN (GLUCOPHAGE) 500 mg tablet Take 2 Tabs by mouth two (2) times daily (with meals). 120 Tab 5    gabapentin (NEURONTIN) 800 mg tablet Take 1 Tab by mouth three (3) times daily. Max Daily Amount: 2,400 mg. 90 Tab 3    celecoxib (CELEBREX) 200 mg capsule Take 1 Cap by mouth daily for 90 days. 30 Cap 2    Omeprazole delayed release (PRILOSEC D/R) 20 mg tablet Take 1 Tab by mouth two (2) times a day.  60 Tab 5    OTHER Diclofenac / Gabapentin in Lidocaine / Prilocaine 2.5%/2.5%/2.6% cream. 240 g 3    albuterol (PROVENTIL HFA, VENTOLIN HFA, PROAIR HFA) 90 mcg/actuation inhaler INHALE 1 PUFF BY MOUTH EVERY 4 HOURS AS NEEDED FOR WHEEZING      buprenorphine-naloxone (SUBOXONE) 8-2 mg film sublingaul film dissolve 1 film under the tongue TWICE DAILY      docusate sodium (DOK) 100 mg capsule TAKE ONE CAPSULE BY MOUTH ONCE DAILY AS NEEDED      hydroCHLOROthiazide (MICROZIDE) 12.5 mg capsule Take 12.5 mg by mouth.  metoprolol tartrate (LOPRESSOR) 100 mg IR tablet TAKE ONE TABLET BY MOUTH EVERY DAY      rosuvastatin (CRESTOR) 5 mg tablet Take 5 mg by mouth.  senna-docusate (PERICOLACE) 8.6-50 mg per tablet take 2 tablets BY MOUTH EVERY EVENING AS NEEDED       Allergies   Allergen Reactions    Aspirin Other (comments)     GI Upset    Pcn [Penicillins] Unknown (comments)     Family reported. ROS      Objective: There were no vitals taken for this visit. General: alert, cooperative, no distress   Mental  status: normal mood, behavior, speech, dress, motor activity, and thought processes, able to follow commands   HENT: NCAT   Neck: no visualized mass   Resp: no respiratory distress   Neuro: no gross deficits   Skin: Right hand with visible dog bite marks on first digit and part of hand, moderate swelling noted, poor visualization of wound due to poor connection         Additional exam findings: We discussed the expected course, resolution and complications of the diagnosis(es) in detail. Medication risks, benefits, costs, interactions, and alternatives were discussed as indicated. I advised him to contact the office if his condition worsens, changes or fails to improve as anticipated. He expressed understanding with the diagnosis(es) and plan. Adina Dyer is a 47 y.o. male who was evaluated by a video visit encounter for concerns as above. Patient identification was verified prior to start of the visit. A caregiver was present when appropriate. Due to this being a TeleHealth encounter (During [facility]-38 public health emergency), evaluation of the following organ systems was limited: Vitals/Constitutional/EENT/Resp/CV/GI//MS/Neuro/Skin/Heme-Lymph-Imm.   Pursuant to the emergency declaration under the [address] [facility], [address]5 waiver authority and the [place] Resources and Response Supplemental Appropriations Act, this Virtual  Visit was conducted, with patient's (and/or legal guardian's) consent, to reduce the patient's risk of exposure to COVID-19 and provide necessary medical care. Services were provided through a video synchronous discussion virtually to substitute for in-person clinic visit. Patient and provider were located at their individual homes.       Keyanna Littlejohn NP

## 2020-07-10 DIAGNOSIS — M19.041 OSTEOARTHRITIS OF BOTH HANDS, UNSPECIFIED OSTEOARTHRITIS TYPE: ICD-10-CM

## 2020-07-10 DIAGNOSIS — M19.042 OSTEOARTHRITIS OF BOTH HANDS, UNSPECIFIED OSTEOARTHRITIS TYPE: ICD-10-CM

## 2020-07-10 RX ORDER — CELECOXIB 200 MG/1
CAPSULE ORAL
Qty: 30 CAP | Refills: 2 | Status: SHIPPED | OUTPATIENT
Start: 2020-07-10 | End: 2020-10-27

## 2020-08-12 ENCOUNTER — HOSPITAL ENCOUNTER (OUTPATIENT)
Dept: LAB | Age: 55
Discharge: HOME | End: 2020-08-12

## 2020-08-12 DIAGNOSIS — E11.40 TYPE 2 DIABETES MELLITUS WITH DIABETIC NEUROPATHY, WITHOUT LONG-TERM CURRENT USE OF INSULIN (HCC): ICD-10-CM

## 2020-08-12 DIAGNOSIS — M13.0 POLYARTHRITIS: ICD-10-CM

## 2020-08-12 DIAGNOSIS — E78.00 HYPERCHOLESTEROLEMIA: ICD-10-CM

## 2020-08-12 DIAGNOSIS — I10 ESSENTIAL HYPERTENSION: ICD-10-CM

## 2020-08-12 LAB
BASOPHILS # BLD: 0.1 K/UL (ref 0–0.1)
BASOPHILS NFR BLD: 1 % (ref 0–1)
COMMENT, HOLDF: NORMAL
DIFFERENTIAL METHOD BLD: NORMAL
EOSINOPHIL # BLD: 0.4 K/UL (ref 0–0.4)
EOSINOPHIL NFR BLD: 4 % (ref 0–7)
ERYTHROCYTE [DISTWIDTH] IN BLOOD BY AUTOMATED COUNT: 14.4 % (ref 11.5–14.5)
EST. AVERAGE GLUCOSE BLD GHB EST-MCNC: 148 MG/DL
HBA1C MFR BLD: 6.8 % (ref 4–5.6)
HCT VFR BLD AUTO: 42.9 % (ref 36.6–50.3)
HGB BLD-MCNC: 13.8 G/DL (ref 12.1–17)
IMM GRANULOCYTES # BLD AUTO: 0 K/UL (ref 0–0.04)
IMM GRANULOCYTES NFR BLD AUTO: 0 % (ref 0–0.5)
LYMPHOCYTES # BLD: 1.7 K/UL (ref 0.8–3.5)
LYMPHOCYTES NFR BLD: 16 % (ref 12–49)
MCH RBC QN AUTO: 31.1 PG (ref 26–34)
MCHC RBC AUTO-ENTMCNC: 32.2 G/DL (ref 30–36.5)
MCV RBC AUTO: 96.6 FL (ref 80–99)
MONOCYTES # BLD: 1 K/UL (ref 0–1)
MONOCYTES NFR BLD: 10 % (ref 5–13)
NEUTS SEG # BLD: 7.5 K/UL (ref 1.8–8)
NEUTS SEG NFR BLD: 69 % (ref 32–75)
NRBC # BLD: 0 K/UL (ref 0–0.01)
NRBC BLD-RTO: 0 PER 100 WBC
PLATELET # BLD AUTO: 199 K/UL (ref 150–400)
PMV BLD AUTO: 10.6 FL (ref 8.9–12.9)
RBC # BLD AUTO: 4.44 M/UL (ref 4.1–5.7)
SAMPLES BEING HELD,HOLD: NORMAL
WBC # BLD AUTO: 10.8 K/UL (ref 4.1–11.1)

## 2020-08-13 LAB
ALBUMIN SERPL-MCNC: 4.3 G/DL (ref 3.5–5)
ALBUMIN/GLOB SERPL: 1.3 {RATIO} (ref 1.1–2.2)
ALP SERPL-CCNC: 103 U/L (ref 45–117)
ALT SERPL-CCNC: 45 U/L (ref 12–78)
ANION GAP SERPL CALC-SCNC: 5 MMOL/L (ref 5–15)
AST SERPL-CCNC: 31 U/L (ref 15–37)
BILIRUB SERPL-MCNC: 0.9 MG/DL (ref 0.2–1)
BUN SERPL-MCNC: 16 MG/DL (ref 6–20)
BUN/CREAT SERPL: 17 (ref 12–20)
CALCIUM SERPL-MCNC: 8.9 MG/DL (ref 8.5–10.1)
CHLORIDE SERPL-SCNC: 104 MMOL/L (ref 97–108)
CHOLEST SERPL-MCNC: 132 MG/DL
CO2 SERPL-SCNC: 28 MMOL/L (ref 21–32)
CREAT SERPL-MCNC: 0.96 MG/DL (ref 0.7–1.3)
GLOBULIN SER CALC-MCNC: 3.2 G/DL (ref 2–4)
GLUCOSE SERPL-MCNC: 129 MG/DL (ref 65–100)
HDLC SERPL-MCNC: 31 MG/DL
HDLC SERPL: 4.3 {RATIO} (ref 0–5)
LDLC SERPL CALC-MCNC: 71.6 MG/DL (ref 0–100)
LIPID PROFILE,FLP: NORMAL
POTASSIUM SERPL-SCNC: 4.1 MMOL/L (ref 3.5–5.1)
PROT SERPL-MCNC: 7.5 G/DL (ref 6.4–8.2)
RHEUMATOID FACT SERPL-ACNC: <10 IU/ML
SODIUM SERPL-SCNC: 137 MMOL/L (ref 136–145)
TRIGL SERPL-MCNC: 147 MG/DL (ref ?–150)
URATE SERPL-MCNC: 4.5 MG/DL (ref 3.5–7.2)
VLDLC SERPL CALC-MCNC: 29.4 MG/DL

## 2020-08-13 NOTE — PROGRESS NOTES
Results released via [employer] as follows:    Hi Mr Jose Luis Dick is out on maternity leave, your lab results are as follows:  - Your uric acid level to check for gout came back normal   - Your rheumatoid factor to rule out rheumatoid arthritis also came back normal  - Your diabetes and cholesterol are at goal, continue taking your diabetes and cholesterol medications  - Your complete blood count, electrolytes, kidney and liver function are all within normal limits. I suggest you follow up in 6 months to recheck your diabetes and cholesterol labs, please let me know if you have any questions!     Denver Navarro NP

## 2020-09-03 RX ORDER — PHENOL/SODIUM PHENOLATE
20 AEROSOL, SPRAY (ML) MUCOUS MEMBRANE 2 TIMES DAILY
Qty: 60 TAB | Refills: 5 | Status: SHIPPED | OUTPATIENT
Start: 2020-09-03 | End: 2020-10-09 | Stop reason: SDUPTHER

## 2020-10-02 DIAGNOSIS — G56.00 CARPAL TUNNEL SYNDROME, UNSPECIFIED LATERALITY: ICD-10-CM

## 2020-10-02 RX ORDER — GABAPENTIN 800 MG/1
TABLET ORAL
Qty: 45 TAB | Refills: 0 | Status: SHIPPED | OUTPATIENT
Start: 2020-10-02 | End: 2020-10-27 | Stop reason: SDUPTHER

## 2020-10-02 NOTE — TELEPHONE ENCOUNTER
Pt has appt for 10/14, would like to know if a small supply of gabapentin can be sent until appt date,please call wife back once completed  .     CB#:[phone]

## 2020-10-02 NOTE — TELEPHONE ENCOUNTER
Called and spoke with patient. Advised that 15 day supply has been sent in to last him until apt. Patient verbalized understanding.

## 2020-10-10 RX ORDER — PHENOL/SODIUM PHENOLATE
20 AEROSOL, SPRAY (ML) MUCOUS MEMBRANE 2 TIMES DAILY
Qty: 60 TAB | Refills: 0 | Status: SHIPPED | OUTPATIENT
Start: 2020-10-10 | End: 2020-10-27

## 2020-10-27 ENCOUNTER — TELEPHONE (OUTPATIENT)
Dept: FAMILY MEDICINE CLINIC | Age: 55
End: 2020-10-27

## 2020-10-27 ENCOUNTER — VIRTUAL VISIT (OUTPATIENT)
Dept: FAMILY MEDICINE CLINIC | Age: 55
End: 2020-10-27
Payer: COMMERCIAL

## 2020-10-27 DIAGNOSIS — G56.00 CARPAL TUNNEL SYNDROME, UNSPECIFIED LATERALITY: ICD-10-CM

## 2020-10-27 DIAGNOSIS — K21.9 GASTROESOPHAGEAL REFLUX DISEASE, UNSPECIFIED WHETHER ESOPHAGITIS PRESENT: ICD-10-CM

## 2020-10-27 DIAGNOSIS — I10 ESSENTIAL HYPERTENSION: ICD-10-CM

## 2020-10-27 DIAGNOSIS — F17.200 SMOKING ADDICTION: ICD-10-CM

## 2020-10-27 DIAGNOSIS — Z00.00 ENCOUNTER FOR ANNUAL PHYSICAL EXAM: Primary | ICD-10-CM

## 2020-10-27 DIAGNOSIS — E11.40 TYPE 2 DIABETES MELLITUS WITH DIABETIC NEUROPATHY, WITHOUT LONG-TERM CURRENT USE OF INSULIN (HCC): ICD-10-CM

## 2020-10-27 DIAGNOSIS — Z11.59 ENCOUNTER FOR HEPATITIS C SCREENING TEST FOR LOW RISK PATIENT: ICD-10-CM

## 2020-10-27 DIAGNOSIS — E78.00 HYPERCHOLESTEROLEMIA: ICD-10-CM

## 2020-10-27 PROCEDURE — 99214 OFFICE O/P EST MOD 30 MIN: CPT | Performed by: NURSE PRACTITIONER

## 2020-10-27 RX ORDER — VARENICLINE TARTRATE 1 MG/1
1 TABLET, FILM COATED ORAL 2 TIMES DAILY
Qty: 60 TAB | Refills: 4 | Status: SHIPPED | OUTPATIENT
Start: 2020-10-27 | End: 2021-01-22

## 2020-10-27 RX ORDER — GABAPENTIN 800 MG/1
TABLET ORAL
Qty: 90 TAB | Refills: 5 | Status: SHIPPED | OUTPATIENT
Start: 2020-10-27 | End: 2021-01-22 | Stop reason: SDUPTHER

## 2020-10-27 RX ORDER — PANTOPRAZOLE SODIUM 40 MG/1
40 TABLET, DELAYED RELEASE ORAL DAILY
Qty: 90 TAB | Refills: 1 | Status: SHIPPED | OUTPATIENT
Start: 2020-10-27 | End: 2021-01-04 | Stop reason: SDUPTHER

## 2020-10-27 RX ORDER — VARENICLINE TARTRATE 25 MG
KIT ORAL
Qty: 1 DOSE PACK | Refills: 0 | Status: SHIPPED | OUTPATIENT
Start: 2020-10-27 | End: 2021-01-22

## 2020-10-27 NOTE — TELEPHONE ENCOUNTER
Reviewed. Probably only needs to take one of the vitamin B's but hard for me to say as I don't know what dose they are and why he is taking them.

## 2020-10-27 NOTE — PROGRESS NOTES
Chief Complaint   Patient presents with    Physical    Labs    Esophageal Reflux     Patient in office today for cpe and labs. Pt have c/o of acid reflux. Have not been able to take omeprazole due to insurance coverage,pt states have been using an otc brand with no relief.

## 2020-10-27 NOTE — PROGRESS NOTES
Rukhsana Rubin is a 47 y.o. male who was seen by synchronous (real-time) audio-video technology on 10/27/2020 for Physical; Labs; and Esophageal Reflux        Assessment & Plan:   Diagnoses and all orders for this visit:    1. Encounter for annual physical exam    2. Type 2 diabetes mellitus with diabetic neuropathy, without long-term current use of insulin (HCC)  -     HEMOGLOBIN A1C WITH EAG; Future  -     MICROALBUMIN, UR, RAND W/ MICROALB/CREAT RATIO; Future  Will notify results and deviate plan based on findings. 3. Hypercholesterolemia  -     LIPID PANEL; Future  Will notify results and deviate plan based on findings. 4. Essential hypertension  -     METABOLIC PANEL, COMPREHENSIVE; Future  Continue to monitor BP closely and take medications as prescribed. 5. Gastroesophageal reflux disease, unspecified whether esophagitis present  -     pantoprazole (PROTONIX) 40 mg tablet; Take 1 Tab by mouth daily. Start protonix daily as prescribed. If sx persist or worsen, will need to follow up with GI.   6. Carpal tunnel syndrome, unspecified laterality  -     gabapentin (NEURONTIN) 800 mg tablet; TAKE ONE TABLET BY MOUTH THREE TIMES DAILY (max OF 2,400mg daily)  Refilled rx. 7. Smoking addiction  -     varenicline (CHANTIX STARTER KRYSTLE) 0.5 mg (11)- 1 mg (42) DsPk; Take as directed. -     varenicline (CHANTIX) 1 mg tablet; Take 1 Tab by mouth two (2) times a day. Start med regimen as prescribed. Reviewed SEs/ADRs of medication and how to take, enc to see pt instructions in mychart. 8. Encounter for hepatitis C screening test for low risk patient  -     HEPATITIS C AB; Future  Screening, asx. I spent at least 15 minutes on this visit with this established patient. [id number]  Subjective:     Chief Complaint   Patient presents with    Physical    Labs    Esophageal Reflux     Patient in office today for cpe and labs. Pt have c/o of acid reflux.   Have not been able to take omeprazole due to insurance coverage, pt states have been using an otc brand with no relief. Has tried multiple OTCs without improvement. Woke up on Sat due to sx and it made him uncomfortable all day Sunday. [place] like he was breathing fire. Denies previously being on protonix. Was on prilosec for about 15 years. Pt had endoscopy and was told he had damage due to his GERD. Pt recalls having a colonoscopy in early 2019 while he was incarcerated. Was seeing a doctor in [facility] who should have all of his previous records. Health Maintenance Due   Topic Date Due    Hepatitis C Screening  1965    Pneumococcal 0-64 years (1 of 1 - PPSV23) 11/16/1971    Foot Exam Q1  11/16/1975    MICROALBUMIN Q1  11/16/1975    Eye Exam Retinal or Dilated  11/16/1975    DTaP/Tdap/Td series (1 - Tdap) 11/16/1986    Shingrix Vaccine Age 50> (1 of 2) 11/16/2015    Colorectal Cancer Screening Combo  11/16/2015    Flu Vaccine (1) 09/01/2020     Pt is doing well. Pt has not gotten his flu shot. Pt is due for an exam.     Pt is interested in trying to quit smoking. Pt has not previously tried anything. Smoking approx 1 PPD. Denies any history of MI. Denies any cp, sob, and dyspnea. Checking his BP once a month. Usually looks good. Prior to Admission medications    Medication Sig Start Date End Date Taking? Authorizing Provider   gabapentin (NEURONTIN) 800 mg tablet TAKE ONE TABLET BY MOUTH THREE TIMES DAILY (max OF 2,400mg daily) 10/2/20  Yes Kathryn Palmer DO   guaiFENesin ER (MUCINEX) 600 mg ER tablet Take 1 Tab by mouth two (2) times daily as needed for Congestion. 4/8/20  Yes Joy Carvajal NP   metFORMIN (GLUCOPHAGE) 500 mg tablet Take 2 Tabs by mouth two (2) times daily (with meals).  3/31/20  Yes Peter Castellon NP   albuterol (PROVENTIL HFA, VENTOLIN HFA, PROAIR HFA) 90 mcg/actuation inhaler INHALE 1 PUFF BY MOUTH EVERY 4 HOURS AS NEEDED FOR WHEEZING 12/26/19  Yes Provider, Historical buprenorphine-naloxone (SUBOXONE) 8-2 mg film sublingaul film dissolve 1 film under the tongue TWICE DAILY 11/19/19  Yes Provider, Historical   hydroCHLOROthiazide (MICROZIDE) 12.5 mg capsule Take 12.5 mg by mouth. 10/25/19  Yes Provider, Historical   metoprolol tartrate (LOPRESSOR) 100 mg IR tablet TAKE ONE TABLET BY MOUTH EVERY DAY 10/25/19  Yes Provider, Historical   rosuvastatin (CRESTOR) 5 mg tablet Take 5 mg by mouth. 9/18/19  Yes Provider, Historical   senna-docusate (PERICOLACE) 8.6-50 mg per tablet take 2 tablets BY MOUTH EVERY EVENING AS NEEDED 10/25/19  Yes Provider, Historical   Omeprazole delayed release (PRILOSEC D/R) 20 mg tablet Take 1 Tab by mouth two (2) times a day. 10/10/20 10/27/20  Srikanth Salomon NP   celecoxib (CELEBREX) 200 mg capsule TAKE ONE CAPSULE BY MOUTH EVERY DAY 7/10/20 10/27/20  Henry Pepe NP   OTHER Diclofenac / Gabapentin in Lidocaine / Prilocaine 2.5%/2.5%/2.6% cream. 2/11/20 10/27/20  Henry Pepe NP   docusate sodium (DOK) 100 mg capsule TAKE ONE CAPSULE BY MOUTH ONCE DAILY AS NEEDED 11/19/19 10/27/20  Provider, Historical     Patient Active Problem List    Diagnosis Date Noted    Restless leg syndrome     Hypertension     Hypercholesterolemia     Type 2 diabetes mellitus with diabetic neuropathy (HCC)     GERD (gastroesophageal reflux disease)      Current Outpatient Medications   Medication Sig Dispense Refill    gabapentin (NEURONTIN) 800 mg tablet TAKE ONE TABLET BY MOUTH THREE TIMES DAILY (max OF 2,400mg daily) 90 Tab 5    pantoprazole (PROTONIX) 40 mg tablet Take 1 Tab by mouth daily. 90 Tab 1    varenicline (CHANTIX STARTER KRYSTLE) 0.5 mg (11)- 1 mg (42) DsPk Take as directed. 1 Dose Pack 0    varenicline (CHANTIX) 1 mg tablet Take 1 Tab by mouth two (2) times a day. 60 Tab 4    guaiFENesin ER (MUCINEX) 600 mg ER tablet Take 1 Tab by mouth two (2) times daily as needed for Congestion.  30 Tab 0    metFORMIN (GLUCOPHAGE) 500 mg tablet Take 2 Tabs by mouth two (2) times daily (with meals). 120 Tab 5    albuterol (PROVENTIL HFA, VENTOLIN HFA, PROAIR HFA) 90 mcg/actuation inhaler INHALE 1 PUFF BY MOUTH EVERY 4 HOURS AS NEEDED FOR WHEEZING      buprenorphine-naloxone (SUBOXONE) 8-2 mg film sublingaul film dissolve 1 film under the tongue TWICE DAILY      hydroCHLOROthiazide (MICROZIDE) 12.5 mg capsule Take 12.5 mg by mouth.  metoprolol tartrate (LOPRESSOR) 100 mg IR tablet TAKE ONE TABLET BY MOUTH EVERY DAY      rosuvastatin (CRESTOR) 5 mg tablet Take 5 mg by mouth.  senna-docusate (PERICOLACE) 8.6-50 mg per tablet take 2 tablets BY MOUTH EVERY EVENING AS NEEDED       Allergies   Allergen Reactions    Aspirin Other (comments)     GI Upset    Pcn [Penicillins] Unknown (comments)     Family reported. ROS    Objective:   No flowsheet data found. General: alert, cooperative, no distress   Mental  status: normal mood, behavior, speech, dress, motor activity, and thought processes, able to follow commands   HENT: NCAT   Neck: no visualized mass   Resp: no respiratory distress   Neuro: no gross deficits             Additional exam findings: We discussed the expected course, resolution and complications of the diagnosis(es) in detail. Medication risks, benefits, costs, interactions, and alternatives were discussed as indicated. I advised him to contact the office if his condition worsens, changes or fails to improve as anticipated. He expressed understanding with the diagnosis(es) and plan. Ge Pitts, who was evaluated through a patient-initiated, synchronous (real-time) audio-video encounter, and/or his healthcare decision maker, is aware that it is a billable service, with coverage as determined by his insurance carrier. He provided verbal consent to proceed: Yes, and patient identification was verified.  It was conducted pursuant to the emergency declaration under the [address] [facility], [address] waiver authority and the [employer] Act. A caregiver was present when appropriate. Ability to conduct physical exam was limited. I was in the office. The patient was at home.       Su Mederos NP

## 2020-10-27 NOTE — TELEPHONE ENCOUNTER
Patient's wife/Latrice stating patient is taking two Vitamin B's and a Vitamin D. Ms Stinson's phone: [phone]. Pharmacy on file.

## 2020-10-27 NOTE — PATIENT INSTRUCTIONS
I am prescribing you the medication Chantix to aid in smoking cessation. Start taking this medication one week before you intend to quit smoking. You will start by taking 0.5 mg once daily for the first three days, then 0.5 mg twice daily for the next four days, then after day seven 1 mg in the morning and 1 mg in the evening. This comes in a starter pack with easy directions. Common side effects of this medication include but are not limited to anxiety, insomnia, irritability, restlessness, diarrhea, gingivitis, nausea, flushing, and musculoskeletal pain. One of the most reported side effects is vivid, unusual dreams but this is usually transient. Please notify me if you develop any symptoms of cardiovascular disease like chest pain, shortness of breath, calf pain when walking, or sudden onset of weakness, numbness, or difficulty speaking.

## 2020-11-27 ENCOUNTER — VIRTUAL VISIT (OUTPATIENT)
Dept: FAMILY MEDICINE CLINIC | Age: 55
End: 2020-11-27
Payer: COMMERCIAL

## 2020-11-27 DIAGNOSIS — K21.00 GASTROESOPHAGEAL REFLUX DISEASE WITH ESOPHAGITIS WITHOUT HEMORRHAGE: Primary | ICD-10-CM

## 2020-11-27 PROCEDURE — 99213 OFFICE O/P EST LOW 20 MIN: CPT | Performed by: NURSE PRACTITIONER

## 2020-11-27 RX ORDER — SUCRALFATE 1 G/10ML
1 SUSPENSION ORAL
Qty: 414 ML | Refills: 2 | Status: SHIPPED | OUTPATIENT
Start: 2020-11-27 | End: 2021-01-22

## 2020-11-27 NOTE — PROGRESS NOTES
Chief Complaint   Patient presents with    Esophageal Reflux     Patient vv appt today for acid reflux that has worsened in the past couple months. Pt states would have periods of flare ups of acid reflux for couple days then resolved. Pt have been treating with omeprazole for 13 yrs. Pt have been on protonix for a couple wks-noted on slight improvement. Pt does vomiting with sx. Will have to sleep upright due to acid reflux. Pt notes when bending over will noted acid reflux. Pt noted hoarseness in voice that began yesterday. Pt last GI appt was 2-3yrs ago,endoscopy was noted to show the damages of acid reflux but not the cause.

## 2020-11-27 NOTE — PROGRESS NOTES
Morro Bass is a 54 y.o. male who was seen by synchronous (real-time) audio-video technology on 11/27/2020 for Esophageal Reflux        Assessment & Plan:   Diagnoses and all orders for this visit:    1. Gastroesophageal reflux disease with esophagitis without hemorrhage  -     REFERRAL TO GASTROENTEROLOGY  -     sucralfate (CARAFATE) 100 mg/mL suspension; Take 10 mL by mouth four (4) times daily as needed for GI Pain. Recommended pt est care with GI for further evaluation. PRN carafate for sx. Monitor stools closely. Reviewed acute s/sx that warrant more immediate medical attention. I spent at least 15 minutes on this visit with this established patient. 712  Subjective:     Chief Complaint   Patient presents with    Esophageal Reflux     Patient vv appt today for acid reflux that has worsened in the past couple months. Pt states would have periods of flare ups of acid reflux for couple days then resolved. Pt have been treating with omeprazole for 13 yrs. Pt have been on protonix for a couple wks-noted only slight improvement. Taking 40 mg daily without improvement. Pt does vomit with sx. Will have to sleep upright due to acid reflux. Pt notes when bending over will noted acid reflux. Pt noted hoarseness in voice that began yesterday. Has intermittent abd pain in the epigastric region. Denies any bloody or tarry stools. Pt had a colonoscopy a few years ago. Pt last GI appt was 2-3yrs ago, endoscopy was noted to show the damages of acid reflux but not the cause. Voice is hoarse. Some could be environmental. Noticed it after working a job earlier this week. On day 2. Going to see an oral surgeon on Monday to have a tooth removed in [place]. Prior to Admission medications    Medication Sig Start Date End Date Taking?  Authorizing Provider   gabapentin (NEURONTIN) 800 mg tablet TAKE ONE TABLET BY MOUTH THREE TIMES DAILY (max OF 2,400mg daily) 10/27/20  Yes Rachelle Mustafa NP   pantoprazole (PROTONIX) 40 mg tablet Take 1 Tab by mouth daily. 10/27/20  Yes Meek Solis NP   metFORMIN (GLUCOPHAGE) 500 mg tablet Take 2 Tabs by mouth two (2) times daily (with meals). 3/31/20  Yes Meek Solis NP   albuterol (PROVENTIL HFA, VENTOLIN HFA, PROAIR HFA) 90 mcg/actuation inhaler INHALE 1 PUFF BY MOUTH EVERY 4 HOURS AS NEEDED FOR WHEEZING 12/26/19  Yes Provider, Historical   buprenorphine-naloxone (SUBOXONE) 8-2 mg film sublingaul film dissolve 1 film under the tongue TWICE DAILY 11/19/19  Yes Provider, Historical   hydroCHLOROthiazide (MICROZIDE) 12.5 mg capsule Take 12.5 mg by mouth. 10/25/19  Yes Provider, Historical   metoprolol tartrate (LOPRESSOR) 100 mg IR tablet TAKE ONE TABLET BY MOUTH EVERY DAY 10/25/19  Yes Provider, Historical   rosuvastatin (CRESTOR) 5 mg tablet Take 5 mg by mouth. 9/18/19  Yes Provider, Historical   senna-docusate (PERICOLACE) 8.6-50 mg per tablet take 2 tablets BY MOUTH EVERY EVENING AS NEEDED 10/25/19  Yes Provider, Historical   varenicline (CHANTIX STARTER KRYSTLE) 0.5 mg (11)- 1 mg (42) DsPk Take as directed. 10/27/20   Meek Solis NP   varenicline (CHANTIX) 1 mg tablet Take 1 Tab by mouth two (2) times a day. 10/27/20   Meek Solis NP   guaiFENesin ER (MUCINEX) 600 mg ER tablet Take 1 Tab by mouth two (2) times daily as needed for Congestion. 4/8/20   Mono Reese NP     Patient Active Problem List    Diagnosis Date Noted    Restless leg syndrome     Hypertension     Hypercholesterolemia     Type 2 diabetes mellitus with diabetic neuropathy (HCC)     GERD (gastroesophageal reflux disease)      Current Outpatient Medications   Medication Sig Dispense Refill    sucralfate (CARAFATE) 100 mg/mL suspension Take 10 mL by mouth four (4) times daily as needed for GI Pain.  414 mL 2    gabapentin (NEURONTIN) 800 mg tablet TAKE ONE TABLET BY MOUTH THREE TIMES DAILY (max OF 2,400mg daily) 90 Tab 5    pantoprazole (PROTONIX) 40 mg tablet Take 1 Tab by mouth daily. 90 Tab 1    metFORMIN (GLUCOPHAGE) 500 mg tablet Take 2 Tabs by mouth two (2) times daily (with meals). 120 Tab 5    albuterol (PROVENTIL HFA, VENTOLIN HFA, PROAIR HFA) 90 mcg/actuation inhaler INHALE 1 PUFF BY MOUTH EVERY 4 HOURS AS NEEDED FOR WHEEZING      buprenorphine-naloxone (SUBOXONE) 8-2 mg film sublingaul film dissolve 1 film under the tongue TWICE DAILY      hydroCHLOROthiazide (MICROZIDE) 12.5 mg capsule Take 12.5 mg by mouth.  metoprolol tartrate (LOPRESSOR) 100 mg IR tablet TAKE ONE TABLET BY MOUTH EVERY DAY      rosuvastatin (CRESTOR) 5 mg tablet Take 5 mg by mouth.  senna-docusate (PERICOLACE) 8.6-50 mg per tablet take 2 tablets BY MOUTH EVERY EVENING AS NEEDED      varenicline (CHANTIX STARTER KRYSTLE) 0.5 mg (11)- 1 mg (42) DsPk Take as directed. 1 Dose Pack 0    varenicline (CHANTIX) 1 mg tablet Take 1 Tab by mouth two (2) times a day. 60 Tab 4    guaiFENesin ER (MUCINEX) 600 mg ER tablet Take 1 Tab by mouth two (2) times daily as needed for Congestion. 30 Tab 0     Allergies   Allergen Reactions    Aspirin Other (comments)     GI Upset    Pcn [Penicillins] Unknown (comments)     Family reported. ROS    Objective:   No flowsheet data found. General: alert, cooperative, no distress   Mental  status: normal mood, behavior, speech, dress, motor activity, and thought processes, able to follow commands   HENT: NCAT; hoarse speech   Neck: no visualized mass   Resp: no respiratory distress                 Additional exam findings: We discussed the expected course, resolution and complications of the diagnosis(es) in detail. Medication risks, benefits, costs, interactions, and alternatives were discussed as indicated. I advised him to contact the office if his condition worsens, changes or fails to improve as anticipated. He expressed understanding with the diagnosis(es) and plan.        Xuan Ocasio., who was evaluated through a patient-initiated, synchronous (real-time) audio-video encounter, and/or his healthcare decision maker, is aware that it is a billable service, with coverage as determined by his insurance carrier. He provided verbal consent to proceed: Yes, and patient identification was verified. It was conducted pursuant to the emergency declaration under the [address] [place] [address], [address] [place] [address] and the [employer] and [employer] Act. A caregiver was present when appropriate. Ability to conduct physical exam was limited. I was at home. The patient was at home.       eTd Torres NP

## 2020-11-30 DIAGNOSIS — K21.00 GASTROESOPHAGEAL REFLUX DISEASE WITH ESOPHAGITIS WITHOUT HEMORRHAGE: Primary | ICD-10-CM

## 2020-11-30 RX ORDER — SUCRALFATE 1 G/1
1 TABLET ORAL 4 TIMES DAILY
Qty: 120 TAB | Refills: 1 | Status: SHIPPED | OUTPATIENT
Start: 2020-11-30 | End: 2021-01-22 | Stop reason: SDUPTHER

## 2020-12-28 ENCOUNTER — TELEPHONE (OUTPATIENT)
Dept: FAMILY MEDICINE CLINIC | Age: 55
End: 2020-12-28

## 2020-12-28 DIAGNOSIS — E11.40 TYPE 2 DIABETES MELLITUS WITH DIABETIC NEUROPATHY, WITHOUT LONG-TERM CURRENT USE OF INSULIN (HCC): ICD-10-CM

## 2020-12-28 RX ORDER — METFORMIN HYDROCHLORIDE 500 MG/1
1000 TABLET ORAL 2 TIMES DAILY WITH MEALS
Qty: 120 TAB | Refills: 5 | Status: SHIPPED | OUTPATIENT
Start: 2020-12-28 | End: 2021-01-22 | Stop reason: SDUPTHER

## 2020-12-28 NOTE — TELEPHONE ENCOUNTER
----- Message from [employer] sent at 12/28/2020  9:43 AM EST -----  Regarding: EMRE Gentile/Merlin  Contact: [phone]  General Message/Vendor Calls    Caller's first and last name:Latrice Stinson, wife      Reason for call:med check/refill      Callback required yes/no and why:yes to discuss meds and call \"all of them in\"      Best contact number(s):[phone]      Details to clarify the request:pt wife is needing to call \" all meds in\" please advise      [employer]

## 2020-12-28 NOTE — TELEPHONE ENCOUNTER
Please send rx for metformin to [facility] [address]. Advised gabapentin was sent in on 10/27 with 5 refills-contact pharmacy.

## 2021-01-04 ENCOUNTER — TELEPHONE (OUTPATIENT)
Dept: FAMILY MEDICINE CLINIC | Age: 56
End: 2021-01-04

## 2021-01-04 DIAGNOSIS — K21.9 GASTROESOPHAGEAL REFLUX DISEASE, UNSPECIFIED WHETHER ESOPHAGITIS PRESENT: ICD-10-CM

## 2021-01-04 RX ORDER — PANTOPRAZOLE SODIUM 40 MG/1
40 TABLET, DELAYED RELEASE ORAL DAILY
Qty: 90 TAB | Refills: 1 | Status: SHIPPED | OUTPATIENT
Start: 2021-01-04 | End: 2021-01-22 | Stop reason: SDUPTHER

## 2021-01-04 NOTE — TELEPHONE ENCOUNTER
Called pharmacy script that was sent on 10/27 is noted and refills are available,pharmacist states he can have script ready in a hr,pt notified.

## 2021-01-04 NOTE — TELEPHONE ENCOUNTER
Pt called. He needs refill on gabapentin. Last refilled 10.27.2020 with 5 refills. Per pt, pharm states he is out of refills. Please call pt at [phone]. Pt was given info for [place] for COVID test.

## 2021-01-22 ENCOUNTER — OFFICE VISIT (OUTPATIENT)
Dept: FAMILY MEDICINE CLINIC | Age: 56
End: 2021-01-22
Payer: COMMERCIAL

## 2021-01-22 VITALS
TEMPERATURE: 97.6 F | OXYGEN SATURATION: 95 % | BODY MASS INDEX: 32.13 KG/M2 | HEART RATE: 84 BPM | HEIGHT: 68 IN | WEIGHT: 212 LBS | SYSTOLIC BLOOD PRESSURE: 150 MMHG | RESPIRATION RATE: 18 BRPM | DIASTOLIC BLOOD PRESSURE: 73 MMHG

## 2021-01-22 DIAGNOSIS — E11.40 TYPE 2 DIABETES MELLITUS WITH DIABETIC NEUROPATHY, WITHOUT LONG-TERM CURRENT USE OF INSULIN (HCC): Primary | ICD-10-CM

## 2021-01-22 DIAGNOSIS — G56.00 CARPAL TUNNEL SYNDROME, UNSPECIFIED LATERALITY: ICD-10-CM

## 2021-01-22 DIAGNOSIS — I10 ESSENTIAL HYPERTENSION: ICD-10-CM

## 2021-01-22 DIAGNOSIS — Z13.29 SCREENING FOR THYROID DISORDER: ICD-10-CM

## 2021-01-22 DIAGNOSIS — Z23 NEEDS FLU SHOT: ICD-10-CM

## 2021-01-22 DIAGNOSIS — Z11.59 ENCOUNTER FOR HEPATITIS C SCREENING TEST FOR LOW RISK PATIENT: ICD-10-CM

## 2021-01-22 DIAGNOSIS — Z00.00 ENCOUNTER FOR ANNUAL PHYSICAL EXAM: ICD-10-CM

## 2021-01-22 DIAGNOSIS — K21.9 GASTROESOPHAGEAL REFLUX DISEASE, UNSPECIFIED WHETHER ESOPHAGITIS PRESENT: ICD-10-CM

## 2021-01-22 DIAGNOSIS — K21.00 GASTROESOPHAGEAL REFLUX DISEASE WITH ESOPHAGITIS WITHOUT HEMORRHAGE: ICD-10-CM

## 2021-01-22 DIAGNOSIS — Z23 ENCOUNTER FOR IMMUNIZATION: ICD-10-CM

## 2021-01-22 DIAGNOSIS — E78.00 HYPERCHOLESTEROLEMIA: ICD-10-CM

## 2021-01-22 DIAGNOSIS — R53.83 FATIGUE, UNSPECIFIED TYPE: ICD-10-CM

## 2021-01-22 DIAGNOSIS — Z12.5 SCREENING PSA (PROSTATE SPECIFIC ANTIGEN): ICD-10-CM

## 2021-01-22 DIAGNOSIS — K21.9 GASTROESOPHAGEAL REFLUX DISEASE WITHOUT ESOPHAGITIS: ICD-10-CM

## 2021-01-22 LAB
COMMENT, HOLDF: NORMAL
SAMPLES BEING HELD,HOLD: NORMAL

## 2021-01-22 PROCEDURE — 99214 OFFICE O/P EST MOD 30 MIN: CPT | Performed by: NURSE PRACTITIONER

## 2021-01-22 RX ORDER — SUCRALFATE 1 G/1
1 TABLET ORAL
Qty: 180 TAB | Refills: 1 | Status: SHIPPED | OUTPATIENT
Start: 2021-01-22 | End: 2021-04-13 | Stop reason: SDUPTHER

## 2021-01-22 RX ORDER — HYDROCHLOROTHIAZIDE 12.5 MG/1
12.5 CAPSULE ORAL DAILY
Qty: 90 CAP | Refills: 3 | Status: SHIPPED | OUTPATIENT
Start: 2021-01-22 | End: 2022-01-19 | Stop reason: SDUPTHER

## 2021-01-22 RX ORDER — GABAPENTIN 800 MG/1
TABLET ORAL
Qty: 270 TAB | Refills: 1 | Status: SHIPPED | OUTPATIENT
Start: 2021-01-22 | End: 2021-05-17 | Stop reason: SDUPTHER

## 2021-01-22 RX ORDER — METOPROLOL TARTRATE 100 MG/1
TABLET ORAL
Qty: 90 TAB | Refills: 3 | Status: SHIPPED | OUTPATIENT
Start: 2021-01-22 | End: 2022-01-20 | Stop reason: SDUPTHER

## 2021-01-22 RX ORDER — METFORMIN HYDROCHLORIDE 500 MG/1
1000 TABLET ORAL 2 TIMES DAILY WITH MEALS
Qty: 360 TAB | Refills: 1 | Status: SHIPPED | OUTPATIENT
Start: 2021-01-22 | End: 2021-07-16

## 2021-01-22 RX ORDER — ROSUVASTATIN CALCIUM 5 MG/1
5 TABLET, COATED ORAL DAILY
Qty: 90 TAB | Refills: 3 | Status: SHIPPED | OUTPATIENT
Start: 2021-01-22 | End: 2022-01-20 | Stop reason: SDUPTHER

## 2021-01-22 RX ORDER — PANTOPRAZOLE SODIUM 40 MG/1
40 TABLET, DELAYED RELEASE ORAL DAILY
Qty: 90 TAB | Refills: 1 | Status: SHIPPED | OUTPATIENT
Start: 2021-01-22 | End: 2021-09-29

## 2021-01-22 NOTE — PROGRESS NOTES
Chief Complaint   Patient presents with    Physical    Labs     Patient in office today for cpe and fasting labs. Pt unsure how long he is been out of metoprolol and hctz-does note fatigue and elevated bp. Health Maintenance Due   Topic Date Due    Hepatitis C Screening  1965    Pneumococcal 0-64 years (1 of 1 - PPSV23) 11/16/1971    Foot Exam Q1  11/16/1975    MICROALBUMIN Q1  11/16/1975    Eye Exam Retinal or Dilated  11/16/1975    COVID-19 Vaccine (1 of 2) 11/16/1981    DTaP/Tdap/Td series (1 - Tdap) 11/16/1986    Shingrix Vaccine Age 50> (1 of 2) 11/16/2015    Colorectal Cancer Screening Combo  11/16/2015     Pt reports that he has not been feeling well. Has not been taking his BP medications for \"I don't know for how long. \"    Pt has been off of hctz, metoprolol, and crestor for some time now. Taking carafate once a day as needed for his stomach and protonix. Did not get a flu shot. Needs a pnuemonia shot. Having a hard time finding someone who accepts his eye insurance. Pt had a colonoscopy in 2018. Has an appt for endoscopy on 2/1 with GI. Denies any other concerns at this time. Chief Complaint   Patient presents with   Navin Gee     he is a 54y.o. year old male who presents for evalution. Reviewed PmHx, RxHx, FmHx, SocHx, AllgHx and updated and dated in the chart.     Review of Systems - negative except as listed above in the HPI    Objective:     Vitals:    01/22/21 0814   BP: (!) 150/73   Pulse: 84   Resp: 18   Temp: 97.6 °F (36.4 °C)   TempSrc: Temporal   SpO2: 95%   Weight: 212 lb (96.2 kg)   Height: 5' 8\" (1.727 m)     Physical Examination: General appearance - alert, well appearing, and in no distress  Mental status - alert, oriented to person, place, and time, normal mood, behavior, speech, dress, motor activity, and thought processes  Eyes - pupils equal and reactive, extraocular eye movements intact  Ears - bilateral TM's and external ear canals normal  Nose - normal and patent, no erythema, discharge or polyps and normal nontender sinuses  Mouth - mucous membranes moist, pharynx normal without lesions  Neck - supple, no significant adenopathy, carotids upstroke normal bilaterally, no bruits, thyroid exam: thyroid is normal in size without nodules or tenderness  Chest - clear to auscultation, no wheezes, rales or rhonchi, symmetric air entry  Heart - normal rate, regular rhythm, normal S1, S2, no murmurs  Extremities - peripheral pulses normal, no ankle edema, no clubbing or cyanosis  Skin - normal coloration and turgor, no rashes, no suspicious skin lesions noted    Assessment/ Plan:   Diagnoses and all orders for this visit:    1. Encounter for annual physical exam    2. Type 2 diabetes mellitus with diabetic neuropathy, without long-term current use of insulin (HCC)  -     HEMOGLOBIN A1C WITH EAG; Future  -     MICROALBUMIN, UR, RAND W/ MICROALB/CREAT RATIO; Future  -     metFORMIN (GLUCOPHAGE) 500 mg tablet; Take 2 Tabs by mouth two (2) times daily (with meals). Will notify results and deviate plan based on findings. 3. Essential hypertension  -     METABOLIC PANEL, COMPREHENSIVE; Future  -     CBC WITH AUTOMATED DIFF; Future  -     hydroCHLOROthiazide (MICROZIDE) 12.5 mg capsule; Take 1 Cap by mouth daily. -     metoprolol tartrate (LOPRESSOR) 100 mg IR tablet; TAKE ONE TABLET BY MOUTH EVERY DAY  BP elevated today but has been off of microzide and lopressor. Follow up in 2 weeks to recheck BP and review labs. 4. Hypercholesterolemia  -     LIPID PANEL; Future  -     rosuvastatin (CRESTOR) 5 mg tablet; Take 1 Tab by mouth daily. Resume rx. Lipid check today will be with pt not having been on his crestor for an unknown amount of time. 5. Gastroesophageal reflux disease without esophagitis / 7. Gastroesophageal reflux disease, unspecified whether esophagitis present / 8.  Gastroesophageal reflux disease with esophagitis without hemorrhage  -     pantoprazole (PROTONIX) 40 mg tablet; Take 1 Tab by mouth daily. -     sucralfate (Carafate) 1 gram tablet; Take 1 Tab by mouth two (2) times daily as needed (GERD). Crush into 3 Tbs water to make a loose watery mixture. Has an upcoming endoscopy with GI. Stable on current meds for now. 6. Encounter for hepatitis C screening test for low risk patient  -     HEPATITIS C AB; Future  Screening, asx.   9. Carpal tunnel syndrome, unspecified laterality  -     gabapentin (NEURONTIN) 800 mg tablet; TAKE ONE TABLET BY MOUTH THREE TIMES DAILY (max OF 2,400mg daily)  Refilled rx. Stable on med regimen. 10. Screening PSA (prostate specific antigen)  -     PSA W/ REFLX FREE PSA; Future  Screening, asx. 11. Screening for thyroid disorder  -     TSH 3RD GENERATION; Future  Screening, asx. 12. Needs flu shot / 13. Encounter for immunization  Needs a flu and pneumonia shot but we don't accept insurance for immunizations. Enc to complete at local pharmacy. 14. Fatigue, unspecified type  -     TESTOSTERONE, FREE & TOTAL; Future  Will notify results and deviate plan based on findings. Follow-up and Dispositions    · Return in about 2 weeks (around 2/5/2021) for recheck BP and follow up. I have discussed the diagnosis with the patient and the intended plan as seen in the above orders. The patient has received an after-visit summary and questions were answered concerning future plans. Medication Side Effects and Warnings were discussed with patient: yes  Patient Labs were reviewed and or requested: yes  Patient Past Records were reviewed and or requested  yes  Patient / Caregiver Understanding of treatment plan was verbalized during office visit YES    TU Solano    There are no Patient Instructions on file for this visit.

## 2021-01-22 NOTE — PROGRESS NOTES
Chief Complaint   Patient presents with    Physical    Labs     Patient in office today for cpe and fasting labs. Pt unsure how long he is been out of metoprolol and hctz-does note fatigue and elevated bp. 1. Have you been to the ER, urgent care clinic since your last visit? Hospitalized since your last visit? No    2. Have you seen or consulted any other health care providers outside of the [address] [place] [address] since your last visit? Include any pap smears or colon screening.  No

## 2021-01-24 LAB
PSA SERPL-MCNC: 0.5 NG/ML (ref 0–4)
REFLEX CRITERIA: NORMAL

## 2021-01-24 NOTE — PROGRESS NOTES
Please notify pt that his PSA is normal. Still waiting for other results. If not back by Monday we may need to call the lab to follow up on where the rest of them are.

## 2021-01-25 LAB
ALBUMIN SERPL-MCNC: 4.2 G/DL (ref 3.5–5)
ALBUMIN/GLOB SERPL: 1.3 {RATIO} (ref 1.1–2.2)
ALP SERPL-CCNC: 100 U/L (ref 45–117)
ALT SERPL-CCNC: 101 U/L (ref 12–78)
ANION GAP SERPL CALC-SCNC: 5 MMOL/L (ref 5–15)
AST SERPL-CCNC: 59 U/L (ref 15–37)
BASOPHILS # BLD: 0.1 K/UL (ref 0–0.1)
BASOPHILS NFR BLD: 1 % (ref 0–1)
BILIRUB SERPL-MCNC: 0.6 MG/DL (ref 0.2–1)
BUN SERPL-MCNC: 13 MG/DL (ref 6–20)
BUN/CREAT SERPL: 15 (ref 12–20)
CALCIUM SERPL-MCNC: 9.7 MG/DL (ref 8.5–10.1)
CHLORIDE SERPL-SCNC: 103 MMOL/L (ref 97–108)
CHOLEST SERPL-MCNC: 183 MG/DL
CO2 SERPL-SCNC: 30 MMOL/L (ref 21–32)
CREAT SERPL-MCNC: 0.84 MG/DL (ref 0.7–1.3)
DIFFERENTIAL METHOD BLD: ABNORMAL
EOSINOPHIL # BLD: 0.4 K/UL (ref 0–0.4)
EOSINOPHIL NFR BLD: 5 % (ref 0–7)
ERYTHROCYTE [DISTWIDTH] IN BLOOD BY AUTOMATED COUNT: 14.6 % (ref 11.5–14.5)
EST. AVERAGE GLUCOSE BLD GHB EST-MCNC: 131 MG/DL
GLOBULIN SER CALC-MCNC: 3.2 G/DL (ref 2–4)
GLUCOSE SERPL-MCNC: 126 MG/DL (ref 65–100)
HBA1C MFR BLD: 6.2 % (ref 4–5.6)
HCT VFR BLD AUTO: 45.1 % (ref 36.6–50.3)
HCV AB SERPL QL IA: NONREACTIVE
HCV COMMENT,HCGAC: NORMAL
HDLC SERPL-MCNC: 40 MG/DL
HDLC SERPL: 4.6 {RATIO} (ref 0–5)
HGB BLD-MCNC: 14.8 G/DL (ref 12.1–17)
IMM GRANULOCYTES # BLD AUTO: 0.1 K/UL (ref 0–0.04)
IMM GRANULOCYTES NFR BLD AUTO: 1 % (ref 0–0.5)
LDLC SERPL CALC-MCNC: 94.8 MG/DL (ref 0–100)
LIPID PROFILE,FLP: ABNORMAL
LYMPHOCYTES # BLD: 1.8 K/UL (ref 0.8–3.5)
LYMPHOCYTES NFR BLD: 22 % (ref 12–49)
MCH RBC QN AUTO: 31.4 PG (ref 26–34)
MCHC RBC AUTO-ENTMCNC: 32.8 G/DL (ref 30–36.5)
MCV RBC AUTO: 95.8 FL (ref 80–99)
MONOCYTES # BLD: 0.9 K/UL (ref 0–1)
MONOCYTES NFR BLD: 11 % (ref 5–13)
NEUTS SEG # BLD: 5 K/UL (ref 1.8–8)
NEUTS SEG NFR BLD: 60 % (ref 32–75)
NRBC # BLD: 0 K/UL (ref 0–0.01)
NRBC BLD-RTO: 0 PER 100 WBC
PLATELET # BLD AUTO: 167 K/UL (ref 150–400)
PMV BLD AUTO: 10.5 FL (ref 8.9–12.9)
POTASSIUM SERPL-SCNC: 4.9 MMOL/L (ref 3.5–5.1)
PROT SERPL-MCNC: 7.4 G/DL (ref 6.4–8.2)
RBC # BLD AUTO: 4.71 M/UL (ref 4.1–5.7)
SODIUM SERPL-SCNC: 138 MMOL/L (ref 136–145)
TESTOST FREE SERPL-MCNC: 4.2 PG/ML (ref 7.2–24)
TESTOST SERPL-MCNC: 228 NG/DL (ref 264–916)
TRIGL SERPL-MCNC: 241 MG/DL (ref ?–150)
TSH SERPL DL<=0.05 MIU/L-ACNC: 1.33 UIU/ML (ref 0.36–3.74)
VLDLC SERPL CALC-MCNC: 48.2 MG/DL
WBC # BLD AUTO: 8.3 K/UL (ref 4.1–11.1)

## 2021-01-25 NOTE — PROGRESS NOTES
The following message was sent to pt via [employer] portal in reference to lab results:    Hi Mr. Pritchett Go,   Your testosterone levels are also low. Lets discuss this at your follow up visit and discuss your treatment options.    TU Gasca

## 2021-01-25 NOTE — PROGRESS NOTES
The following message was sent to pt via [employer] portal in reference to lab results:    Good morning Mr. Vince Samuels are the results of your most recent lab work. I have the following recommendations:    1. Your CBC which looks at your white blood cells, red blood cells, and hemoglobin came back looking normal. No sign of infection or anemia. 2. Your metabolic panel which looks at your blood glucose, liver function, and kidney function looks good minus your ALT and AST being elevated. These are measures of liver function. Any increased alcohol intake or increased use of tylenol (more than 4 g per day)? We should recheck this at your follow up visit next week. 3. Your cholesterol came back looking pretty good. Your triglycerides are slightly elevated. The best way to bring that number down is to incorporate more omega 3's into your diet. Here are some suggestions on how to do that:  - eat 2-3 serving of fish like salmon and trout per week  - eat lots of fresh dark leafy green vegetables  - incorporate more garlic into your diet    4. Your TSH which screens for thyroid disease came back normal. This means you do not have hyper or hypothyroidism. 5. Your hemoglobin a1c shows that your diabetes is very well controlled on your current medication regimen. 6. Your PSA which is a screen for enlarged prostate and prostate cancer also came back normal. We will continue to check this yearly. 7. Your screening test for hepatitis C is negative. We check this once in all baby boomers. Please let me know if you have any questions or concerns regarding these results. We can also discuss further during your appointment next week.    TU Vick

## 2021-02-05 ENCOUNTER — OFFICE VISIT (OUTPATIENT)
Dept: FAMILY MEDICINE CLINIC | Age: 56
End: 2021-02-05
Payer: COMMERCIAL

## 2021-02-05 VITALS
SYSTOLIC BLOOD PRESSURE: 122 MMHG | HEIGHT: 68 IN | BODY MASS INDEX: 32.28 KG/M2 | TEMPERATURE: 97.9 F | OXYGEN SATURATION: 95 % | WEIGHT: 213 LBS | HEART RATE: 71 BPM | DIASTOLIC BLOOD PRESSURE: 86 MMHG | RESPIRATION RATE: 18 BRPM

## 2021-02-05 DIAGNOSIS — F17.200 SMOKING ADDICTION: ICD-10-CM

## 2021-02-05 DIAGNOSIS — E29.1 MALE HYPOGONADISM: Primary | ICD-10-CM

## 2021-02-05 DIAGNOSIS — I10 ESSENTIAL HYPERTENSION: ICD-10-CM

## 2021-02-05 DIAGNOSIS — R79.89 ELEVATED LFTS: ICD-10-CM

## 2021-02-05 PROCEDURE — 99212 OFFICE O/P EST SF 10 MIN: CPT | Performed by: NURSE PRACTITIONER

## 2021-02-05 RX ORDER — TESTOSTERONE CYPIONATE 200 MG/ML
200 INJECTION INTRAMUSCULAR ONCE
Qty: 1 ML | Refills: 0
Start: 2021-02-05 | End: 2021-02-05

## 2021-02-05 RX ORDER — IBUPROFEN 200 MG
1 TABLET ORAL EVERY 24 HOURS
Qty: 30 PATCH | Refills: 0 | Status: SHIPPED | OUTPATIENT
Start: 2021-02-05 | End: 2021-03-07

## 2021-02-05 NOTE — PROGRESS NOTES
Chief Complaint   Patient presents with    Hypertension     Patient in office today for recheck bp and follow up in labs. BP was high when he went to the hospital for his EGD. There was a full meal in his stomach, unable to see much due to this. Pt ate at 8 pm the night before. Unsure what the plan is. Has a follow up appt with GI. Pt liver function was elevated. ALT (SGPT) 101  High   12 - 78 U/L Final   AST (SGOT) 59  High   15 - 37 U/L Final   Pt had 4 mixed drinks last night. About 16 ounces of etoh total a few days a week. Does have problems with constipation. Has a hard time passing stools due to how hard they are. Bloated feeling. Suboxone slows him down. T levels were low. Pt is interested in T replacement therapy. Chief Complaint   Patient presents with    Hypertension    Labs     he is a 54y.o. year old male who presents for evalution. Reviewed PmHx, RxHx, FmHx, SocHx, AllgHx and updated and dated in the chart. Review of Systems - negative except as listed above in the HPI    Objective:     Vitals:    02/05/21 0849 02/05/21 0905   BP: (!) 148/104 122/86   Pulse: 71    Resp: 18    Temp: 97.9 °F (36.6 °C)    TempSrc: Oral    SpO2: 95%    Weight: 213 lb (96.6 kg)    Height: 5' 8\" (1.727 m)      Physical Examination: General appearance - alert, well appearing, and in no distress  Mental status - normal mood, behavior, speech, dress, motor activity, and thought processes  Chest - clear to auscultation, no wheezes, rales or rhonchi, symmetric air entry  Heart - normal rate, regular rhythm, normal S1, S2, no murmurs  Extremities - peripheral pulses normal, no ankle edema, no clubbing or cyanosis    Assessment/ Plan:   Diagnoses and all orders for this visit:    1. Male hypogonadism  -     TESTOSTERONE, FREE & TOTAL; Future  Will notify results and deviate plan based on findings. If repeat T low, consider injection every 2 weeks.    2. Elevated LFTs  -     METABOLIC PANEL, COMPREHENSIVE; Future  Will notify results and deviate plan based on findings. Enc pt to decrease etoh consumption. 3. Essential hypertension  BP recheck better. Will continue to monitor closely. Compliant with daily meds. Follow-up and Dispositions    · Return if symptoms worsen or fail to improve. I have discussed the diagnosis with the patient and the intended plan as seen in the above orders. The patient has received an after-visit summary and questions were answered concerning future plans. Medication Side Effects and Warnings were discussed with patient: yes  Patient Labs were reviewed and or requested: yes  Patient Past Records were reviewed and or requested  yes  Patient / Caregiver Understanding of treatment plan was verbalized during office visit YES    Maxim Morataya, FNP-C    There are no Patient Instructions on file for this visit.

## 2021-02-05 NOTE — PROGRESS NOTES
Chief Complaint   Patient presents with    Hypertension     Patient in office today for recheck bp and follow up in labs. Pt liver function was elevated  T levels were low. 1. Have you been to the ER, urgent care clinic since your last visit? Hospitalized since your last visit? No    2. Have you seen or consulted any other health care providers outside of the [address] [place] [address] since your last visit? Include any pap smears or colon screening.  No

## 2021-02-07 NOTE — PROGRESS NOTES
Please notify pt that his LFTS have improved compared to last check. Enc to make a conscious effort to decrease etoh intake and lets rehcheck again in 4-6 weeks.

## 2021-02-08 DIAGNOSIS — E29.1 MALE HYPOGONADISM: Primary | ICD-10-CM

## 2021-02-08 LAB
ALBUMIN SERPL-MCNC: 4.2 G/DL (ref 3.5–5)
ALBUMIN/GLOB SERPL: 1.3 {RATIO} (ref 1.1–2.2)
ALP SERPL-CCNC: 108 U/L (ref 45–117)
ALT SERPL-CCNC: 87 U/L (ref 12–78)
ANION GAP SERPL CALC-SCNC: 4 MMOL/L (ref 5–15)
AST SERPL-CCNC: 48 U/L (ref 15–37)
BILIRUB SERPL-MCNC: 0.5 MG/DL (ref 0.2–1)
BUN SERPL-MCNC: 12 MG/DL (ref 6–20)
BUN/CREAT SERPL: 16 (ref 12–20)
CALCIUM SERPL-MCNC: 9.6 MG/DL (ref 8.5–10.1)
CHLORIDE SERPL-SCNC: 104 MMOL/L (ref 97–108)
CO2 SERPL-SCNC: 32 MMOL/L (ref 21–32)
CREAT SERPL-MCNC: 0.76 MG/DL (ref 0.7–1.3)
GLOBULIN SER CALC-MCNC: 3.2 G/DL (ref 2–4)
GLUCOSE SERPL-MCNC: 121 MG/DL (ref 65–100)
POTASSIUM SERPL-SCNC: 4.8 MMOL/L (ref 3.5–5.1)
PROT SERPL-MCNC: 7.4 G/DL (ref 6.4–8.2)
SODIUM SERPL-SCNC: 140 MMOL/L (ref 136–145)
TESTOST FREE SERPL-MCNC: 3.6 PG/ML (ref 7.2–24)
TESTOST SERPL-MCNC: 164 NG/DL (ref 264–916)

## 2021-02-08 PROCEDURE — 96372 THER/PROPH/DIAG INJ SC/IM: CPT | Performed by: NURSE PRACTITIONER

## 2021-02-08 RX ORDER — SYRINGE W-NEEDLE,DISPOSAB,3 ML 25GX5/8"
SYRINGE, EMPTY DISPOSABLE MISCELLANEOUS
Qty: 24 SYRINGE | Refills: 0 | Status: SHIPPED | OUTPATIENT
Start: 2021-02-08 | End: 2021-02-19 | Stop reason: RX

## 2021-02-08 RX ORDER — TESTOSTERONE CYPIONATE 200 MG/ML
200 INJECTION INTRAMUSCULAR
Qty: 10 ML | Refills: 0
Start: 2021-02-08 | End: 2021-02-08 | Stop reason: SDUPTHER

## 2021-02-08 RX ORDER — TESTOSTERONE CYPIONATE 200 MG/ML
200 INJECTION INTRAMUSCULAR
Qty: 10 ML | Refills: 0 | Status: SHIPPED | OUTPATIENT
Start: 2021-02-08 | End: 2021-08-09

## 2021-02-08 NOTE — PROGRESS NOTES
T persistently low. Will send rx for T for IM injection every 2 weeks to pharmacy on file. Please advise pt to bring rx with someone who can be taught how to administer to office.

## 2021-02-08 NOTE — PROGRESS NOTES
Honestly I recommend he continue 5 mg crestor daily to decrease his risk for heart attack and stroke, francy since he has diabetes.

## 2021-02-08 NOTE — PROGRESS NOTES
Spoke with pt in regards to lab results; pt stated they understood  Pt states he was advised chol levels were good,said at that time he was off chol medication for 3 mo,does he still need to continue to take medication?

## 2021-02-15 ENCOUNTER — OFFICE VISIT (OUTPATIENT)
Dept: FAMILY MEDICINE CLINIC | Age: 56
End: 2021-02-15
Payer: COMMERCIAL

## 2021-02-15 DIAGNOSIS — E29.1 MALE HYPOGONADISM: Primary | ICD-10-CM

## 2021-02-15 PROCEDURE — 96372 THER/PROPH/DIAG INJ SC/IM: CPT | Performed by: NURSE PRACTITIONER

## 2021-02-15 RX ORDER — TESTOSTERONE CYPIONATE 100 MG/ML
200 INJECTION, SOLUTION INTRAMUSCULAR ONCE
Status: COMPLETED | OUTPATIENT
Start: 2021-02-15 | End: 2021-02-15

## 2021-02-15 RX ORDER — TESTOSTERONE CYPIONATE 200 MG/ML
50 INJECTION INTRAMUSCULAR
Status: CANCELLED | OUTPATIENT
Start: 2021-02-15 | End: 2021-02-15

## 2021-02-15 RX ADMIN — TESTOSTERONE CYPIONATE 200 MG: 100 INJECTION, SOLUTION INTRAMUSCULAR at 15:58

## 2021-02-15 NOTE — PROGRESS NOTES
Chief Complaint   Patient presents with    Injection     Patient in office today for T injection only; tolerated well with no complications. Wife was present during appt to show how to administer medication. All questions answered and injection site provided,advised to alt injection sites. Advised to call nurse with any questions or concerns. Pt next injection is in 2 wks.

## 2021-02-18 ENCOUNTER — DOCUMENTATION ONLY (OUTPATIENT)
Dept: FAMILY MEDICINE CLINIC | Age: 56
End: 2021-02-18

## 2021-02-19 DIAGNOSIS — E29.1 MALE HYPOGONADISM: Primary | ICD-10-CM

## 2021-02-19 RX ORDER — SYRINGE W-NEEDLE,DISPOSAB,3 ML 25GX5/8"
SYRINGE, EMPTY DISPOSABLE MISCELLANEOUS
Qty: 50 PEN NEEDLE | Refills: 0 | Status: SHIPPED | OUTPATIENT
Start: 2021-02-19

## 2021-03-15 ENCOUNTER — OFFICE VISIT (OUTPATIENT)
Dept: FAMILY MEDICINE CLINIC | Age: 56
End: 2021-03-15
Payer: COMMERCIAL

## 2021-03-15 VITALS
HEIGHT: 68 IN | BODY MASS INDEX: 32.43 KG/M2 | RESPIRATION RATE: 18 BRPM | SYSTOLIC BLOOD PRESSURE: 103 MMHG | WEIGHT: 214 LBS | DIASTOLIC BLOOD PRESSURE: 66 MMHG | TEMPERATURE: 97.9 F | HEART RATE: 74 BPM | OXYGEN SATURATION: 94 %

## 2021-03-15 DIAGNOSIS — R74.01 TRANSAMINITIS: Primary | ICD-10-CM

## 2021-03-15 DIAGNOSIS — R07.89 ANTERIOR CHEST WALL PAIN: ICD-10-CM

## 2021-03-15 PROCEDURE — 99213 OFFICE O/P EST LOW 20 MIN: CPT | Performed by: NURSE PRACTITIONER

## 2021-03-15 RX ORDER — PREDNISONE 10 MG/1
TABLET ORAL
Qty: 21 TAB | Refills: 0 | OUTPATIENT
Start: 2021-03-15 | End: 2021-06-30

## 2021-03-15 NOTE — PROGRESS NOTES
Chief Complaint   Patient presents with   [facility] Chest Pain     Patient in office today for recheck on LFT's. Pt have c/o of chest pain near left shoulder that began one wk ago. Pt states pain is located near left shoulder and left armpit. Pain is worse when laughing,coughing,or certain movements. Pt does note he was lifting wood prior to pain. Have been treating with otc pain relief cream with no improvement. 1. Have you been to the ER, urgent care clinic since your last visit? Hospitalized since your last visit? No    2. Have you seen or consulted any other health care providers outside of the [address] [place] [address] since your last visit? Include any pap smears or colon screening.  No

## 2021-03-15 NOTE — PROGRESS NOTES
Chief Complaint   Patient presents with   [address] Chest Pain     Patient in office today for recheck on LFT's. Pt have c/o of chest pain near left shoulder that began one wk ago. Pt states pain is located near left shoulder and left armpit. Pain is worse when laughing, coughing, or certain movements. Feels like a stabbing pain. Has noticed increased swelling. Pt does note he was lifting packs of wood oscar prior to pain. Thinks it happened last Monday. Woke up Tuesday with increased soreness and pain. Was reaching into a small space. Had a sudden onset of cramping pain. Took about 5 mins to get off the floor due to pain. Have been treating with otc pain relief cream with no improvement. Taking motrin PRN. Less coughing today due to not having a cigarette. Has been playing phone tag with [employer] regarding his gastric emptying study. Denies any other concerns at this time. Chief Complaint   Patient presents with    Labs    Chest Pain     he is a 54y.o. year old male who presents for evalution. Reviewed PmHx, RxHx, FmHx, SocHx, AllgHx and updated and dated in the chart.     Review of Systems - negative except as listed above in the HPI    Objective:     Vitals:    03/15/21 1542   BP: 103/66   Pulse: 74   Resp: 18   Temp: 97.9 °F (36.6 °C)   TempSrc: Oral   SpO2: 94%   Weight: 214 lb (97.1 kg)   Height: 5' 8\" (1.727 m)     Physical Examination: General appearance - alert, well appearing, and in no distress  Chest - clear to auscultation, no wheezes, rales or rhonchi, symmetric air entry  Heart - normal rate, regular rhythm, normal S1, S2, no murmurs  Musculoskeletal - abnormal exam of left anterior chest, reports  Pain along the lateral pectoral muscle, pain with deep palpation of this large muscle, shoulder ROM intact but elicits pain,  strength 5/5, radial pulse 2+  Extremities - peripheral pulses normal, no edema, no clubbing or cyanosis    Assessment/ Plan:   Diagnoses and all orders for this visit:    1. Transaminitis  -     METABOLIC PANEL, COMPREHENSIVE; Future  Will notify results and deviate plan based on findings. 2. Anterior chest wall pain  -     predniSONE (STERAPRED DS) 10 mg dose pack; See administration instruction per 10mg dose pack  Sx and presentation seem consistent with a pulled pectoral muscle. Complete prednisone as directed. Rest, ice, and stretch as tolerated. If sx persist or worsen, will order US and xray for further evaluation. I have discussed the diagnosis with the patient and the intended plan as seen in the above orders. The patient has received an after-visit summary and questions were answered concerning future plans. Medication Side Effects and Warnings were discussed with patient: yes  Patient Labs were reviewed and or requested: yes  Patient Past Records were reviewed and or requested  yes  Patient / Caregiver Understanding of treatment plan was verbalized during office visit YES    TU Gardner    There are no Patient Instructions on file for this visit.

## 2021-03-16 LAB
ALBUMIN SERPL-MCNC: 4 G/DL (ref 3.5–5)
ALBUMIN/GLOB SERPL: 1.3 {RATIO} (ref 1.1–2.2)
ALP SERPL-CCNC: 74 U/L (ref 45–117)
ALT SERPL-CCNC: 57 U/L (ref 12–78)
ANION GAP SERPL CALC-SCNC: 9 MMOL/L (ref 5–15)
AST SERPL-CCNC: 40 U/L (ref 15–37)
BILIRUB SERPL-MCNC: 0.7 MG/DL (ref 0.2–1)
BUN SERPL-MCNC: 15 MG/DL (ref 6–20)
BUN/CREAT SERPL: 17 (ref 12–20)
CALCIUM SERPL-MCNC: 9.3 MG/DL (ref 8.5–10.1)
CHLORIDE SERPL-SCNC: 104 MMOL/L (ref 97–108)
CO2 SERPL-SCNC: 27 MMOL/L (ref 21–32)
CREAT SERPL-MCNC: 0.87 MG/DL (ref 0.7–1.3)
GLOBULIN SER CALC-MCNC: 3 G/DL (ref 2–4)
GLUCOSE SERPL-MCNC: 157 MG/DL (ref 65–100)
POTASSIUM SERPL-SCNC: 3.9 MMOL/L (ref 3.5–5.1)
PROT SERPL-MCNC: 7 G/DL (ref 6.4–8.2)
SODIUM SERPL-SCNC: 140 MMOL/L (ref 136–145)

## 2021-03-16 NOTE — PROGRESS NOTES
The following message was sent to pt via [facility] portal in reference to lab results:  Hi Mr. Cassandra SahaFan news, your liver function continues to improve and has almost normalized. Keep  Up the good work with drinking less alcohol and trying to follow a healthy lifestyle. We will continue to monitor this with future routine lab work. Lets follow up on your diabetes in 2 months and we can recheck your liver function then. Please let me know if you have any questions or concerns regarding these results.    TU Christopher

## 2021-03-22 DIAGNOSIS — E29.1 MALE HYPOGONADISM: ICD-10-CM

## 2021-03-22 RX ORDER — TESTOSTERONE CYPIONATE 200 MG/ML
200 INJECTION INTRAMUSCULAR
Qty: 10 ML | Refills: 0 | OUTPATIENT
Start: 2021-03-22

## 2021-03-22 NOTE — TELEPHONE ENCOUNTER
Pt states pharmacy was trying to have a one fill date for all meds. Advised testosterone is a controlled substance,will have to correct in May when new rx is due. Advised in May will refill testosterone and all meds at one time so that a fill date is the same for old. Pt stated he understood and confirmed only doing 1 ml every 2wks.

## 2021-03-22 NOTE — TELEPHONE ENCOUNTER
Pt filled 6 mL on 2/8 at pharmacy. That should last him 12 weeks. Please make sure he is administering correctly (1 mL every 2 weeks).

## 2021-04-13 DIAGNOSIS — K21.00 GASTROESOPHAGEAL REFLUX DISEASE WITH ESOPHAGITIS WITHOUT HEMORRHAGE: ICD-10-CM

## 2021-04-14 RX ORDER — SUCRALFATE 1 G/1
1 TABLET ORAL
Qty: 180 TAB | Refills: 1 | Status: SHIPPED | OUTPATIENT
Start: 2021-04-14 | End: 2021-09-29

## 2021-05-17 ENCOUNTER — TELEPHONE (OUTPATIENT)
Dept: FAMILY MEDICINE CLINIC | Age: 56
End: 2021-05-17

## 2021-05-17 DIAGNOSIS — G56.00 CARPAL TUNNEL SYNDROME, UNSPECIFIED LATERALITY: ICD-10-CM

## 2021-05-17 RX ORDER — GABAPENTIN 800 MG/1
TABLET ORAL
Qty: 270 TAB | Refills: 1 | Status: SHIPPED | OUTPATIENT
Start: 2021-05-17 | End: 2022-01-20 | Stop reason: SDUPTHER

## 2021-05-17 NOTE — TELEPHONE ENCOUNTER
Pt states this weekend noted to have diarrhea and vomiting,tried taking pepto bismol which did not improve sx. Pt wanted to know if sx were due to stopping carafate-stopped because he started prilosec. Carafate was stopped 3-4 days ago,started prilosec 2-3 days ago. Pt states he feels better today,was able to keep soup and ginger ale down. Advised carafate would not cause sx,likely due to 24hr gi bug.    Reviewed b.r.a.t diet with,advised to keep hydrated,do not take otc anti-diarrhe medication or pepto bismol,stressed importance handwashing,can return to work 24hrs after sx resolve. Advised would restart carafate until pt has been on prilosec for at least 2 wks,if want to d/c can do so at that time,but need at least 2wks of being on prilosec daily first.  Pt stated he understood.

## 2021-05-17 NOTE — TELEPHONE ENCOUNTER
Patient stopped taking Sucralfate and he then started throwing up with diarrhea. He would like to speak to nurse to see if this is from stop taking the medicine. The throw up was like poop. Patient's phone: [phone].

## 2021-06-30 ENCOUNTER — HOSPITAL ENCOUNTER (EMERGENCY)
Age: 56
Discharge: HOME | End: 2021-06-30
Payer: COMMERCIAL

## 2021-06-30 ENCOUNTER — APPOINTMENT (OUTPATIENT)
Dept: GENERAL RADIOLOGY | Age: 56
End: 2021-06-30
Attending: EMERGENCY MEDICINE
Payer: COMMERCIAL

## 2021-06-30 VITALS
BODY MASS INDEX: 27.4 KG/M2 | SYSTOLIC BLOOD PRESSURE: 120 MMHG | OXYGEN SATURATION: 99 % | WEIGHT: 185 LBS | TEMPERATURE: 98.2 F | HEIGHT: 69 IN | HEART RATE: 72 BPM | DIASTOLIC BLOOD PRESSURE: 74 MMHG | RESPIRATION RATE: 18 BRPM

## 2021-06-30 DIAGNOSIS — J20.9 ACUTE BRONCHITIS, UNSPECIFIED ORGANISM: Primary | ICD-10-CM

## 2021-06-30 DIAGNOSIS — R09.1 PLEURISY: ICD-10-CM

## 2021-06-30 LAB
ALBUMIN SERPL-MCNC: 3.4 G/DL (ref 3.5–5)
ALBUMIN/GLOB SERPL: 1 {RATIO} (ref 1.1–2.2)
ALP SERPL-CCNC: 73 U/L (ref 45–117)
ALT SERPL-CCNC: 61 U/L (ref 12–78)
ANION GAP SERPL CALC-SCNC: 9 MMOL/L (ref 5–15)
AST SERPL W P-5'-P-CCNC: 55 U/L (ref 15–37)
ATRIAL RATE: 80 BPM
BASOPHILS # BLD: 0.1 K/UL (ref 0–0.1)
BASOPHILS NFR BLD: 0 % (ref 0–1)
BILIRUB SERPL-MCNC: 0.6 MG/DL (ref 0.2–1)
BNP SERPL-MCNC: 14 PG/ML
BUN SERPL-MCNC: 13 MG/DL (ref 6–20)
BUN/CREAT SERPL: 15 (ref 12–20)
CA-I BLD-MCNC: 8.7 MG/DL (ref 8.5–10.1)
CALCULATED P AXIS, ECG09: 56 DEGREES
CALCULATED R AXIS, ECG10: 48 DEGREES
CALCULATED T AXIS, ECG11: 55 DEGREES
CHLORIDE SERPL-SCNC: 96 MMOL/L (ref 97–108)
CO2 SERPL-SCNC: 25 MMOL/L (ref 21–32)
CREAT SERPL-MCNC: 0.89 MG/DL (ref 0.7–1.3)
DIAGNOSIS, 93000: NORMAL
DIFFERENTIAL METHOD BLD: ABNORMAL
EOSINOPHIL # BLD: 0.1 K/UL (ref 0–0.4)
EOSINOPHIL NFR BLD: 1 % (ref 0–7)
ERYTHROCYTE [DISTWIDTH] IN BLOOD BY AUTOMATED COUNT: 12.9 % (ref 11.5–14.5)
GLOBULIN SER CALC-MCNC: 3.5 G/DL (ref 2–4)
GLUCOSE SERPL-MCNC: 206 MG/DL (ref 65–100)
HCT VFR BLD AUTO: 41.2 % (ref 36.6–50.3)
HGB BLD-MCNC: 14 G/DL (ref 12.1–17)
IMM GRANULOCYTES # BLD AUTO: 0.1 K/UL (ref 0–0.04)
IMM GRANULOCYTES NFR BLD AUTO: 0 % (ref 0–0.5)
LYMPHOCYTES # BLD: 0.9 K/UL (ref 0.8–3.5)
LYMPHOCYTES NFR BLD: 7 % (ref 12–49)
MCH RBC QN AUTO: 31.9 PG (ref 26–34)
MCHC RBC AUTO-ENTMCNC: 34 G/DL (ref 30–36.5)
MCV RBC AUTO: 93.8 FL (ref 80–99)
MONOCYTES # BLD: 1.5 K/UL (ref 0–1)
MONOCYTES NFR BLD: 11 % (ref 5–13)
NEUTS SEG # BLD: 10.5 K/UL (ref 1.8–8)
NEUTS SEG NFR BLD: 81 % (ref 32–75)
NRBC # BLD: 0 K/UL (ref 0–0.01)
NRBC BLD-RTO: 0 PER 100 WBC
P-R INTERVAL, ECG05: 164 MS
PLATELET # BLD AUTO: 138 K/UL (ref 150–400)
PMV BLD AUTO: 9.9 FL (ref 8.9–12.9)
POTASSIUM SERPL-SCNC: 3.7 MMOL/L (ref 3.5–5.1)
PROT SERPL-MCNC: 6.9 G/DL (ref 6.4–8.2)
Q-T INTERVAL, ECG07: 342 MS
QRS DURATION, ECG06: 78 MS
QTC CALCULATION (BEZET), ECG08: 394 MS
RBC # BLD AUTO: 4.39 M/UL (ref 4.1–5.7)
SODIUM SERPL-SCNC: 130 MMOL/L (ref 136–145)
TROPONIN I SERPL-MCNC: <0.05 NG/ML
TROPONIN I SERPL-MCNC: <0.05 NG/ML
VENTRICULAR RATE, ECG03: 80 BPM
WBC # BLD AUTO: 13 K/UL (ref 4.1–11.1)

## 2021-06-30 PROCEDURE — 94640 AIRWAY INHALATION TREATMENT: CPT

## 2021-06-30 PROCEDURE — 85025 COMPLETE CBC W/AUTO DIFF WBC: CPT

## 2021-06-30 PROCEDURE — 84484 ASSAY OF TROPONIN QUANT: CPT

## 2021-06-30 PROCEDURE — 99284 EMERGENCY DEPT VISIT MOD MDM: CPT

## 2021-06-30 PROCEDURE — 71045 X-RAY EXAM CHEST 1 VIEW: CPT

## 2021-06-30 PROCEDURE — 96374 THER/PROPH/DIAG INJ IV PUSH: CPT

## 2021-06-30 PROCEDURE — 93005 ELECTROCARDIOGRAM TRACING: CPT

## 2021-06-30 PROCEDURE — 36415 COLL VENOUS BLD VENIPUNCTURE: CPT

## 2021-06-30 PROCEDURE — 83880 ASSAY OF NATRIURETIC PEPTIDE: CPT

## 2021-06-30 PROCEDURE — 80053 COMPREHEN METABOLIC PANEL: CPT

## 2021-06-30 PROCEDURE — 74011250637 HC RX REV CODE- 250/637: Performed by: PHYSICIAN ASSISTANT

## 2021-06-30 PROCEDURE — 74011250636 HC RX REV CODE- 250/636: Performed by: PHYSICIAN ASSISTANT

## 2021-06-30 RX ORDER — ALBUTEROL SULFATE 90 UG/1
2 AEROSOL, METERED RESPIRATORY (INHALATION)
Qty: 1 INHALER | Refills: 0 | Status: SHIPPED | OUTPATIENT
Start: 2021-06-30

## 2021-06-30 RX ORDER — ALBUTEROL SULFATE 90 UG/1
2 AEROSOL, METERED RESPIRATORY (INHALATION) ONCE
Status: COMPLETED | OUTPATIENT
Start: 2021-06-30 | End: 2021-06-30

## 2021-06-30 RX ORDER — PREDNISONE 10 MG/1
TABLET ORAL
Qty: 21 TABLET | Refills: 0 | Status: SHIPPED | OUTPATIENT
Start: 2021-06-30 | End: 2021-09-29

## 2021-06-30 RX ORDER — AZITHROMYCIN 250 MG/1
250 TABLET, FILM COATED ORAL SEE ADMIN INSTRUCTIONS
Qty: 6 TABLET | Refills: 0 | Status: SHIPPED | OUTPATIENT
Start: 2021-06-30 | End: 2021-07-06

## 2021-06-30 RX ADMIN — METHYLPREDNISOLONE SODIUM SUCCINATE 125 MG: 125 INJECTION, POWDER, FOR SOLUTION INTRAMUSCULAR; INTRAVENOUS at 11:06

## 2021-06-30 RX ADMIN — ALBUTEROL SULFATE 2 PUFF: 108 AEROSOL, METERED RESPIRATORY (INHALATION) at 10:59

## 2021-06-30 NOTE — ED NOTES
PIV removed with catheter tip intact, bleeding well controlled at this time. A&O x 4 at time of D/C with steady and even gait.

## 2021-06-30 NOTE — ED PROVIDER NOTES
EMERGENCY DEPARTMENT HISTORY AND PHYSICAL EXAM      Date: 6/30/2021  Patient Name: Florida Ledbetter Sr.    History of Presenting Illness     Chief Complaint   Patient presents with    Chest Pain       History Provided By: Patient    HPI: Yumiko Curry., 54 y.o. male with a past medical history significant diabetes and hypertension, high cholesterol, daily smoker presents to the ED with cc of chest pain onset yesterday, right-sided, radiating into the midsternal area with associated shortness of breath. Exacerbating factors include exertion. Alleviating factors include rest.  No history of chest pain or known coronary artery disease. He does not follow with a cardiologist.  Patient reports his shortness of breath improved with 2 puffs of albuterol inhaler. Patient reports associated symptoms of productive cough over the last 2 days. He specifically denies nausea, vomiting, abdominal pain, syncope, palpitations, leg swelling, recent travel, recent surgery, sick contacts, fever, chills, body aches. There are no other complaints, changes, or physical findings at this time. PCP: Naseem Rush NP    Current Outpatient Medications   Medication Sig Dispense Refill    predniSONE (STERAPRED DS) 10 mg dose pack Take 3 tabs for 3 days, take 2 tabs for 3 days, and take 1 tab for 3 days, by mouth, once daily 21 Tablet 0    albuterol (PROVENTIL HFA, VENTOLIN HFA, PROAIR HFA) 90 mcg/actuation inhaler Take 2 Puffs by inhalation every four (4) hours as needed for Wheezing. 1 Inhaler 0    azithromycin (Zithromax Z-Fady) 250 mg tablet Take 1 Tablet by mouth See Admin Instructions for 6 days. Follow instructions on package 6 Tablet 0    gabapentin (NEURONTIN) 800 mg tablet TAKE ONE TABLET BY MOUTH THREE TIMES DAILY (max OF 2,400mg daily) 270 Tab 1    sucralfate (Carafate) 1 gram tablet Take 1 Tab by mouth two (2) times daily as needed (GERD). Crush into 3 Tbs water to make a loose watery mixture.  180 Tab 1    syringe with needle (Syringe 3cc/44Fi9-2/2\") 3 mL 21 gauge x 1 1/2\" syrg For the administration of 1 mL testosterone every 2 weeks. Diagnosis code E29.1. 50 Pen Needle 0    testosterone cypionate (DEPOTESTOTERONE CYPIONATE) 200 mg/mL injection 1 mL by IntraMUSCular route every fourteen (14) days. Max Daily Amount: 200 mg. 10 mL 0    hydroCHLOROthiazide (MICROZIDE) 12.5 mg capsule Take 1 Cap by mouth daily. 90 Cap 3    metoprolol tartrate (LOPRESSOR) 100 mg IR tablet TAKE ONE TABLET BY MOUTH EVERY DAY 90 Tab 3    rosuvastatin (CRESTOR) 5 mg tablet Take 1 Tab by mouth daily. 90 Tab 3    metFORMIN (GLUCOPHAGE) 500 mg tablet Take 2 Tabs by mouth two (2) times daily (with meals). 360 Tab 1    pantoprazole (PROTONIX) 40 mg tablet Take 1 Tab by mouth daily. 90 Tab 1    buprenorphine-naloxone (SUBOXONE) 8-2 mg film sublingaul film dissolve 1 film under the tongue TWICE DAILY      senna-docusate (PERICOLACE) 8.6-50 mg per tablet take 2 tablets BY MOUTH EVERY EVENING AS NEEDED         Past History     Past Medical History:  Past Medical History:   Diagnosis Date    Acid reflux     Diabetes ([address]     Diabetic neuropathy [facility]     Hypercholesterolemia     Hypertension     Restless leg syndrome        Past Surgical History:  Past Surgical History:   Procedure Laterality Date    HX ENDOSCOPY  02/01/2021       Family History:  Family History   Problem Relation Age of Onset    Diabetes Mother     Heart Disease Mother     Heart Disease Father        Social History:  Social History     Tobacco Use    Smoking status: Current Every Day Smoker     Packs/day: 1.00    Smokeless tobacco: Never Used   Substance Use Topics    Alcohol use: Not Currently    Drug use: Not Currently       Allergies: Allergies   Allergen Reactions    Aspirin Other (comments)     GI Upset    Pcn [Penicillins] Unknown (comments)     Family reported.          Review of Systems   Review of Systems   Constitutional: Negative for activity change, chills and fever. HENT: Negative for congestion, ear pain, rhinorrhea and trouble swallowing. Eyes: Negative for pain and visual disturbance. Respiratory: Positive for cough and shortness of breath. Negative for chest tightness. Cardiovascular: Positive for chest pain. Negative for palpitations and leg swelling. Gastrointestinal: Negative for abdominal pain, diarrhea, nausea and vomiting. Genitourinary: Negative for decreased urine volume, difficulty urinating, dysuria and hematuria. Musculoskeletal: Negative for arthralgias and myalgias. Skin: Negative for rash. Neurological: Negative for weakness and headaches. Hematological: Negative for adenopathy. Psychiatric/Behavioral: The patient is not nervous/anxious. All other systems reviewed and are negative. Physical Exam   Physical Exam  Vitals and nursing note reviewed. Constitutional:       General: He is not in acute distress. Appearance: He is well-developed. He is not ill-appearing or diaphoretic. HENT:      Head: Normocephalic and atraumatic. Mouth/Throat:      Mouth: Mucous membranes are moist.   Eyes:      Extraocular Movements: Extraocular movements intact. Pupils: Pupils are equal, round, and reactive to light. Cardiovascular:      Rate and Rhythm: Normal rate and regular rhythm. Pulses: Normal pulses. Heart sounds: Normal heart sounds. No murmur heard. Pulmonary:      Effort: Pulmonary effort is normal. No respiratory distress. Breath sounds: Examination of the right-lower field reveals rhonchi. Examination of the left-lower field reveals rhonchi. Rhonchi present. No wheezing. Chest:      Chest wall: Tenderness present. Abdominal:      General: Abdomen is flat. Bowel sounds are normal.      Palpations: Abdomen is soft. Tenderness: There is no abdominal tenderness. Musculoskeletal:      Right lower leg: No edema. Left lower leg: No edema.    Skin:     General: Skin is warm and dry. Capillary Refill: Capillary refill takes less than 2 seconds. Findings: No rash. Neurological:      General: No focal deficit present. Mental Status: He is alert. Cranial Nerves: No cranial nerve deficit. Psychiatric:         Mood and Affect: Mood normal.         Behavior: Behavior normal.         Diagnostic Study Results     Labs -     Recent Results (from the past 48 hour(s))   EKG, 12 LEAD, INITIAL    Collection Time: 06/30/21  9:43 AM   Result Value Ref Range    Ventricular Rate 80 BPM    Atrial Rate 80 BPM    P-R Interval 164 ms    QRS Duration 78 ms    Q-T Interval 342 ms    QTC Calculation (Bezet) 394 ms    Calculated P Axis 56 degrees    Calculated R Axis 48 degrees    Calculated T Axis 55 degrees    Diagnosis       Normal sinus rhythm  Normal ECG  No previous ECGs available  Confirmed by Karri Holliday ([id number]) on 6/30/2021 4:55:35 PM     CBC WITH AUTOMATED DIFF    Collection Time: 06/30/21 10:28 AM   Result Value Ref Range    WBC 13.0 (H) 4.1 - 11.1 K/uL    RBC 4.39 4. 10 - 5.70 M/uL    HGB 14.0 12.1 - 17.0 g/dL    HCT 41.2 36.6 - 50.3 %    MCV 93.8 80.0 - 99.0 FL    MCH 31.9 26.0 - 34.0 PG    MCHC 34.0 30.0 - 36.5 g/dL    RDW 12.9 11.5 - 14.5 %    PLATELET 877 (L) 318 - 400 K/uL    MPV 9.9 8.9 - 12.9 FL    NRBC 0.0 0.0  WBC    ABSOLUTE NRBC 0.00 0.00 - 0.01 K/uL    NEUTROPHILS 81 (H) 32 - 75 %    LYMPHOCYTES 7 (L) 12 - 49 %    MONOCYTES 11 5 - 13 %    EOSINOPHILS 1 0 - 7 %    BASOPHILS 0 0 - 1 %    IMMATURE GRANULOCYTES 0 0 - 0.5 %    ABS. NEUTROPHILS 10.5 (H) 1.8 - 8.0 K/UL    ABS. LYMPHOCYTES 0.9 0.8 - 3.5 K/UL    ABS. MONOCYTES 1.5 (H) 0.0 - 1.0 K/UL    ABS. EOSINOPHILS 0.1 0.0 - 0.4 K/UL    ABS. BASOPHILS 0.1 0.0 - 0.1 K/UL    ABS. IMM.  GRANS. 0.1 (H) 0.00 - 0.04 K/UL    DF AUTOMATED     METABOLIC PANEL, COMPREHENSIVE    Collection Time: 06/30/21 10:28 AM   Result Value Ref Range    Sodium 130 (L) 136 - 145 mmol/L    Potassium 3.7 3.5 - 5.1 mmol/L Chloride 96 (L) 97 - 108 mmol/L    CO2 25 21 - 32 mmol/L    Anion gap 9 5 - 15 mmol/L    Glucose 206 (H) 65 - 100 mg/dL    BUN 13 6 - 20 mg/dL    Creatinine 0.89 0.70 - 1.30 mg/dL    BUN/Creatinine ratio 15 12 - 20      GFR est AA >60 >60 ml/min/1.73m2    GFR est non-AA >60 >60 ml/min/1.73m2    Calcium 8.7 8.5 - 10.1 mg/dL    Bilirubin, total 0.6 0.2 - 1.0 mg/dL    AST (SGOT) 55 (H) 15 - 37 U/L    ALT (SGPT) 61 12 - 78 U/L    Alk. phosphatase 73 45 - 117 U/L    Protein, total 6.9 6.4 - 8.2 g/dL    Albumin 3.4 (L) 3.5 - 5.0 g/dL    Globulin 3.5 2.0 - 4.0 g/dL    A-G Ratio 1.0 (L) 1.1 - 2.2     TROPONIN I    Collection Time: 06/30/21 10:28 AM   Result Value Ref Range    Troponin-I, Qt. <0.05 <0.05 ng/mL   BNP    Collection Time: 06/30/21 10:28 AM   Result Value Ref Range    NT pro-BNP 14 <125 pg/mL   TROPONIN I    Collection Time: 06/30/21 12:20 PM   Result Value Ref Range    Troponin-I, Qt. <0.05 <0.05 ng/mL       Radiologic Studies -   XR Results (most recent):  Results from Hospital Encounter encounter on 06/30/21    XR CHEST PORT    Narrative  Chest pain. Comparison chest x-ray 12/26/2019. Findings: Frontal single view chest demonstrates normal cardiomediastinal  silhouette. No vascular congestion or pulmonary edema. The lungs are  well-inflated. No infiltrate, effusion, pneumothorax. No free air under the  hemidiaphragms. Bones grossly intact. Impression  No acute findings. CT Results  (Last 48 hours)    None            Medical Decision Making and ED Course   I am the first provider for this patient. I reviewed the vital signs, available nursing notes, past medical history, past surgical history, family history and social history. Vital Signs-Reviewed the patient's vital signs.   Patient Vitals for the past 12 hrs:   Temp Pulse Resp BP SpO2   06/30/21 1509 -- 72 18 120/74 99 %   06/30/21 0946 98.2 °F (36.8 °C) 79 24 123/74 95 %       EKG interpretation: (Preliminary)  Normal sinus rhythm at 80 bpm, no ST-T wave changes, normal EKG, read by Dr. Tawanda Morales at 9:47 AM      Records Reviewed: Nursing Notes and Old Medical Records    Provider Notes (Medical Decision Making):       MDM  Number of Diagnoses or Management Options  Acute bronchitis, unspecified organism  Pleurisy  Diagnosis management comments: Differentials include acute coronary syndrome, bronchitis, COPD, NSTEMI, costochondritis, chest wall pain    HEART Score: 4, moderate probability to acute event, will follow up with PCP       Amount and/or Complexity of Data Reviewed  Clinical lab tests: ordered and reviewed  Tests in the radiology section of CPT®: ordered and reviewed  Review and summarize past medical records: yes          ED Course:   Initial assessment performed. The patients presenting problems have been discussed, and they are in agreement with the care plan formulated and outlined with them. I have encouraged them to ask questions as they arise throughout their visit. 1400  Progress Note:  Patient was re-evaluated at bedside. Patient is feeling much better, resting comfortably on stretcher, vital signs stable, states that he felt better after albuterol inhaler, chest pain improved after inhaler use. Procedures       Elsa Silva PA-C    Procedures     Elsa Silva PA-C        Disposition     Disposition: DC- Adult Discharges: All of the diagnostic tests were reviewed and questions answered. Diagnosis, care plan and treatment options were discussed. The patient understands the instructions and will follow up as directed. The patients results have been reviewed with them. They have been counseled regarding their diagnosis. The patient verbally convey understanding and agreement of the signs, symptoms, diagnosis, treatment and prognosis and additionally agrees to follow up as recommended with their PCP in 24 - 48 hours. They also agree with the care-plan and convey that all of their questions have been answered. I have also put together some discharge instructions for them that include: 1) educational information regarding their diagnosis, 2) how to care for their diagnosis at home, as well a 3) list of reasons why they would want to return to the ED prior to their follow-up appointment, should their condition change. Discharged     DISCHARGE PLAN:  1. Current Discharge Medication List      CONTINUE these medications which have NOT CHANGED    Details   gabapentin (NEURONTIN) 800 mg tablet TAKE ONE TABLET BY MOUTH THREE TIMES DAILY (max OF 2,400mg daily)  Qty: 270 Tab, Refills: 1    Associated Diagnoses: Carpal tunnel syndrome, unspecified laterality      sucralfate (Carafate) 1 gram tablet Take 1 Tab by mouth two (2) times daily as needed (GERD). Crush into 3 Tbs water to make a loose watery mixture. Qty: 180 Tab, Refills: 1    Associated Diagnoses: Gastroesophageal reflux disease with esophagitis without hemorrhage      predniSONE (STERAPRED DS) 10 mg dose pack See administration instruction per 10mg dose pack  Qty: 21 Tab, Refills: 0    Associated Diagnoses: Anterior chest wall pain      syringe with needle (Syringe 3cc/87Pl9-2/2\") 3 mL 21 gauge x 1 1/2\" syrg For the administration of 1 mL testosterone every 2 weeks. Diagnosis code E29.1. Qty: 50 Pen Needle, Refills: 0    Associated Diagnoses: Male hypogonadism      testosterone cypionate (DEPOTESTOTERONE CYPIONATE) 200 mg/mL injection 1 mL by IntraMUSCular route every fourteen (14) days. Max Daily Amount: 200 mg. Qty: 10 mL, Refills: 0    Associated Diagnoses: Male hypogonadism      hydroCHLOROthiazide (MICROZIDE) 12.5 mg capsule Take 1 Cap by mouth daily. Qty: 90 Cap, Refills: 3    Associated Diagnoses: Essential hypertension      metoprolol tartrate (LOPRESSOR) 100 mg IR tablet TAKE ONE TABLET BY MOUTH EVERY DAY  Qty: 90 Tab, Refills: 3    Associated Diagnoses: Essential hypertension      rosuvastatin (CRESTOR) 5 mg tablet Take 1 Tab by mouth daily.   Qty: 90 Tab, Refills: 3    Associated Diagnoses: Hypercholesterolemia      metFORMIN (GLUCOPHAGE) 500 mg tablet Take 2 Tabs by mouth two (2) times daily (with meals). Qty: 360 Tab, Refills: 1    Associated Diagnoses: Type 2 diabetes mellitus with diabetic neuropathy, without long-term current use of insulin (HCC)      pantoprazole (PROTONIX) 40 mg tablet Take 1 Tab by mouth daily. Qty: 90 Tab, Refills: 1    Associated Diagnoses: Gastroesophageal reflux disease, unspecified whether esophagitis present      albuterol (PROVENTIL HFA, VENTOLIN HFA, PROAIR HFA) 90 mcg/actuation inhaler INHALE 1 PUFF BY MOUTH EVERY 4 HOURS AS NEEDED FOR WHEEZING      buprenorphine-naloxone (SUBOXONE) 8-2 mg film sublingaul film dissolve 1 film under the tongue TWICE DAILY    Comments: .      senna-docusate (PERICOLACE) 8.6-50 mg per tablet take 2 tablets BY MOUTH EVERY EVENING AS NEEDED           2. Follow-up Information     Follow up With Specialties Details Why Contact Info    Daisy Garcia NP Nurse Practitioner Schedule an appointment as soon as possible for a visit  for follow up from ER visit [address] [place] [address] [address] EMERGENCY DEPT Emergency Medicine  As needed, If symptoms worsen [address] [address]  [phone]        3. Return to ED if worse   4. Discharge Medication List as of 6/30/2021  3:04 PM      START taking these medications    Details   azithromycin (Zithromax Z-Fady) 250 mg tablet Take 1 Tablet by mouth See Admin Instructions for 6 days.  Follow instructions on package, Normal, Disp-6 Tablet, R-0         CONTINUE these medications which have CHANGED    Details   predniSONE (STERAPRED DS) 10 mg dose pack Take 3 tabs for 3 days, take 2 tabs for 3 days, and take 1 tab for 3 days, by mouth, once daily, Normal, Disp-21 Tablet, R-0      albuterol (PROVENTIL HFA, VENTOLIN HFA, PROAIR HFA) 90 mcg/actuation inhaler Take 2 Puffs by inhalation every four (4) hours as needed for Wheezing., Normal, Disp-1 Inhaler, R-0         CONTINUE these medications which have NOT CHANGED    Details   gabapentin (NEURONTIN) 800 mg tablet TAKE ONE TABLET BY MOUTH THREE TIMES DAILY (max OF 2,400mg daily), Normal, Disp-270 Tab, R-1      sucralfate (Carafate) 1 gram tablet Take 1 Tab by mouth two (2) times daily as needed (GERD). Crush into 3 Tbs water to make a loose watery mixture., Normal, Disp-180 Tab, R-1      syringe with needle (Syringe 3cc/28Np1-1/2\") 3 mL 21 gauge x 1 1/2\" syrg For the administration of 1 mL testosterone every 2 weeks. Diagnosis code E29.1., Normal, Disp-50 Pen Needle, R-0      testosterone cypionate (DEPOTESTOTERONE CYPIONATE) 200 mg/mL injection 1 mL by IntraMUSCular route every fourteen (14) days. Max Daily Amount: 200 mg., Normal, Disp-10 mL, R-0      hydroCHLOROthiazide (MICROZIDE) 12.5 mg capsule Take 1 Cap by mouth daily. , Normal, Disp-90 Cap, R-3      metoprolol tartrate (LOPRESSOR) 100 mg IR tablet TAKE ONE TABLET BY MOUTH EVERY DAY, Normal, Disp-90 Tab, R-3      rosuvastatin (CRESTOR) 5 mg tablet Take 1 Tab by mouth daily. , Normal, Disp-90 Tab, R-3      metFORMIN (GLUCOPHAGE) 500 mg tablet Take 2 Tabs by mouth two (2) times daily (with meals). , Normal, Disp-360 Tab, R-1      pantoprazole (PROTONIX) 40 mg tablet Take 1 Tab by mouth daily. , Normal, Disp-90 Tab, R-1      buprenorphine-naloxone (SUBOXONE) 8-2 mg film sublingaul film dissolve 1 film under the tongue TWICE DAILY, Historical Med.      senna-docusate (PERICOLACE) 8.6-50 mg per tablet take 2 tablets BY MOUTH EVERY EVENING AS NEEDED, Historical Med             Diagnosis     Clinical Impression:   1. Acute bronchitis, unspecified organism    2. Pleurisy        Attestations:    Marlen Oglesby PA-C    Please note that this dictation was completed with [employer], the [employer] voice recognition software.   Quite often unanticipated grammatical, syntax, homophones, and other interpretive errors are inadvertently transcribed by the computer software. Please disregard these errors. Please excuse any errors that have escaped final proofreading. Thank you.

## 2021-07-09 ENCOUNTER — TELEPHONE (OUTPATIENT)
Dept: FAMILY MEDICINE CLINIC | Age: 56
End: 2021-07-09

## 2021-07-09 NOTE — TELEPHONE ENCOUNTER
Can either see him virtually on Tuesday or he will have to wait until quarantine period is over to be seen in office.

## 2021-07-09 NOTE — TELEPHONE ENCOUNTER
Pt is being d/c from [address] [place] [address] today for COVID pneumonia. He was admitted Wednesday. No appts are available next week and he stated that he does not want to see anyone else but Beatriz Richards and does not want a VV appt. Please call him at [phone].

## 2021-07-13 ENCOUNTER — VIRTUAL VISIT (OUTPATIENT)
Dept: FAMILY MEDICINE CLINIC | Age: 56
End: 2021-07-13
Payer: COMMERCIAL

## 2021-07-13 DIAGNOSIS — J12.82 PNEUMONIA DUE TO COVID-19 VIRUS: Primary | ICD-10-CM

## 2021-07-13 DIAGNOSIS — Z86.16 HISTORY OF COVID-19: ICD-10-CM

## 2021-07-13 DIAGNOSIS — U07.1 PNEUMONIA DUE TO COVID-19 VIRUS: Primary | ICD-10-CM

## 2021-07-13 PROCEDURE — 99213 OFFICE O/P EST LOW 20 MIN: CPT | Performed by: NURSE PRACTITIONER

## 2021-07-13 RX ORDER — MOMETASONE FUROATE AND FORMOTEROL FUMARATE DIHYDRATE 100; 5 UG/1; UG/1
2 AEROSOL RESPIRATORY (INHALATION) 2 TIMES DAILY
Qty: 1 INHALER | Refills: 2 | Status: SHIPPED | OUTPATIENT
Start: 2021-07-13 | End: 2021-09-29

## 2021-07-13 NOTE — PROGRESS NOTES
Tito Schaffer is a 54 y.o. male who was seen by synchronous (real-time) audio-video technology on 7/13/2021 for Hospital Follow Up        [address] [place] [address]:   Diagnoses and all orders for this visit:    1. Pneumonia due to COVID-19 virus  -     CONTACT ISOLATION; Standing  -     DROPLET ISOLATION; Standing  -     mometasone-formoterol (Dulera) 100-5 mcg/actuation HFA inhaler; Take 2 Puffs by inhalation two (2) times a day. symbicort not being approved by insurance so will send in alternative. Enc pt to do 2 puffs BID as prescribed. Continue albuterol as needed for acute bronchospasm sx. Continue quarantine period until the 17th. Continue to rest,  Push fluids and increase activity slowly as tolerated. Follow up with any new or worsening sx. Once again enc smoking cessation. 2. History of COVID-19      I spent at least 15 minutes on this visit with this established patient. 712  Subjective:     Chief Complaint   Patient presents with   [facility] Follow Up     Pt vv appt today for hosp f/u. Reports going to [facility] a week before he was admitted to [facility]. Was having some problems with increased dyspnea and disoriented feeling. Was told he had pleurisy. Requested to be tested for COVID but they didn't think it was necessary. Sx progressively worsened with no improvement after completing medications that were prescribed. So he decided to go to [facility]. Pt was seen at [facility] last Wednesday and immediately placed on supplemental oxygen. They deemed him COVID positive based on CT results. Pt was admitted for about 2 days. Denies being placed on a vent. Had to be on supplemental oxygen for 1.5 days. Pt was dx with pneumonia related to covid. Pt was prescribed inhaler-but was not able to get due to insurance coverage. Tried to use the riding  yesterday which really fatigued him. Has been using inhalers that were provided to him in the hospital.   Albuterol - using \"a couple times a day. \"  Atrovent - using that BID. Symbicort was sent to pharmacy but insurance is not covering. Pt is still smoking. Pt has c/o of easy fatigue since admission. Quarantine period is over on 7/17. Pt not able to tell nurse what meds he is taking-states wife puts all medications in pill box and he just takes. Prior to Admission medications    Medication Sig Start Date End Date Taking? Authorizing Provider   gabapentin (NEURONTIN) 800 mg tablet TAKE ONE TABLET BY MOUTH THREE TIMES DAILY (max OF 2,400mg daily) 5/17/21  Yes Mariola Farrell NP   sucralfate (Carafate) 1 gram tablet Take 1 Tab by mouth two (2) times daily as needed (GERD). Crush into 3 Tbs water to make a loose watery mixture. 4/14/21  Yes Mariola Farrell NP   syringe with needle (Syringe 3cc/51Ow1-1/2\") 3 mL 21 gauge x 1 1/2\" syrg For the administration of 1 mL testosterone every 2 weeks. Diagnosis code E29.1. 2/19/21  Yes Mariola Farrell NP   testosterone cypionate (DEPOTESTOTERONE CYPIONATE) 200 mg/mL injection 1 mL by IntraMUSCular route every fourteen (14) days. Max Daily Amount: 200 mg. 2/8/21  Yes Mariola Farrell NP   hydroCHLOROthiazide (MICROZIDE) 12.5 mg capsule Take 1 Cap by mouth daily. 1/22/21  Yes Mariola Farrell NP   metoprolol tartrate (LOPRESSOR) 100 mg IR tablet TAKE ONE TABLET BY MOUTH EVERY DAY 1/22/21  Yes Mariola Farrell NP   rosuvastatin (CRESTOR) 5 mg tablet Take 1 Tab by mouth daily. 1/22/21  Yes Mariola Farrell NP   metFORMIN (GLUCOPHAGE) 500 mg tablet Take 2 Tabs by mouth two (2) times daily (with meals). 1/22/21  Yes Mariola Farrell NP   pantoprazole (PROTONIX) 40 mg tablet Take 1 Tab by mouth daily.  1/22/21  Yes Mariola Farrell NP   buprenorphine-naloxone (SUBOXONE) 8-2 mg film sublingaul film dissolve 1 film under the tongue TWICE DAILY 11/19/19  Yes Provider, Historical   senna-docusate (PERICOLACE) 8.6-50 mg per tablet take 2 tablets BY MOUTH EVERY EVENING AS NEEDED 10/25/19  Yes Provider, Historical predniSONE (STERAPRED DS) 10 mg dose pack Take 3 tabs for 3 days, take 2 tabs for 3 days, and take 1 tab for 3 days, by mouth, once daily  Patient not taking: Reported on 7/13/2021 6/30/21   George Garcia PA-C   albuterol (PROVENTIL HFA, VENTOLIN HFA, PROAIR HFA) 90 mcg/actuation inhaler Take 2 Puffs by inhalation every four (4) hours as needed for Wheezing. Patient not taking: Reported on 7/13/2021 6/30/21   George Garcia PA-C     Patient Active Problem List    Diagnosis Date Noted    History of COVID-19 07/13/2021    Restless leg syndrome     Hypertension     Hypercholesterolemia     Type 2 diabetes mellitus with diabetic neuropathy (HCC)     GERD (gastroesophageal reflux disease)      Current Outpatient Medications   Medication Sig Dispense Refill    mometasone-formoterol (Dulera) 100-5 mcg/actuation HFA inhaler Take 2 Puffs by inhalation two (2) times a day. 1 Inhaler 2    gabapentin (NEURONTIN) 800 mg tablet TAKE ONE TABLET BY MOUTH THREE TIMES DAILY (max OF 2,400mg daily) 270 Tab 1    sucralfate (Carafate) 1 gram tablet Take 1 Tab by mouth two (2) times daily as needed (GERD). Crush into 3 Tbs water to make a loose watery mixture. 180 Tab 1    syringe with needle (Syringe 3cc/11Dm9-9/2\") 3 mL 21 gauge x 1 1/2\" syrg For the administration of 1 mL testosterone every 2 weeks. Diagnosis code E29.1. 50 Pen Needle 0    testosterone cypionate (DEPOTESTOTERONE CYPIONATE) 200 mg/mL injection 1 mL by IntraMUSCular route every fourteen (14) days. Max Daily Amount: 200 mg. 10 mL 0    hydroCHLOROthiazide (MICROZIDE) 12.5 mg capsule Take 1 Cap by mouth daily. 90 Cap 3    metoprolol tartrate (LOPRESSOR) 100 mg IR tablet TAKE ONE TABLET BY MOUTH EVERY DAY 90 Tab 3    rosuvastatin (CRESTOR) 5 mg tablet Take 1 Tab by mouth daily. 90 Tab 3    metFORMIN (GLUCOPHAGE) 500 mg tablet Take 2 Tabs by mouth two (2) times daily (with meals).  360 Tab 1    pantoprazole (PROTONIX) 40 mg tablet Take 1 Tab by mouth daily. 90 Tab 1    buprenorphine-naloxone (SUBOXONE) 8-2 mg film sublingaul film dissolve 1 film under the tongue TWICE DAILY      senna-docusate (PERICOLACE) 8.6-50 mg per tablet take 2 tablets BY MOUTH EVERY EVENING AS NEEDED      predniSONE (STERAPRED DS) 10 mg dose pack Take 3 tabs for 3 days, take 2 tabs for 3 days, and take 1 tab for 3 days, by mouth, once daily (Patient not taking: Reported on 7/13/2021) 21 Tablet 0    albuterol (PROVENTIL HFA, VENTOLIN HFA, PROAIR HFA) 90 mcg/actuation inhaler Take 2 Puffs by inhalation every four (4) hours as needed for Wheezing. (Patient not taking: Reported on 7/13/2021) 1 Inhaler 0     Allergies   Allergen Reactions    Aspirin Other (comments)     GI Upset    Pcn [Penicillins] Unknown (comments)     Family reported. ROS    Objective:   No flowsheet data found. General: alert, cooperative, no distress   Mental  status: normal mood, behavior, speech, dress, motor activity, and thought processes, able to follow commands   HENT: NCAT   Neck: no visualized mass   Resp: no respiratory distress                 Additional exam findings: We discussed the expected course, resolution and complications of the diagnosis(es) in detail. Medication risks, benefits, costs, interactions, and alternatives were discussed as indicated. I advised him to contact the office if his condition worsens, changes or fails to improve as anticipated. He expressed understanding with the diagnosis(es) and plan. Dona Camarena., was evaluated through a synchronous (real-time) audio-video encounter. The patient (or guardian if applicable) is aware that this is a billable service. Verbal consent to proceed has been obtained within the past 12 months. The visit was conducted pursuant to the emergency declaration under the [facility], [address] [place] [address] authority and the [employer] Act.   Patient identification was verified, and a caregiver was present when appropriate. The patient was located in a state where the provider was credentialed to provide care.     Nuha Saleem NP

## 2021-07-13 NOTE — PROGRESS NOTES
Chief Complaint   Patient presents with   [facility] Follow Up     Pt vv appt today for hosp f/u. Pt was seen at [facility] last Wednesday due to difficulty breathing. Pt was dx with pneumonia related to covid. Pt was prescribed inhaler-but was not able to get due to insurance coverage. Pt has c/o of easy fatigue since admission. Pt not able to tell nurse what meds he is taking-states wife outs all medications in pill box and he just takes. 1. Have you been to the ER, urgent care clinic since your last visit? Hospitalized since your last visit? Yes see note    2. Have you seen or consulted any other health care providers outside of the [address] [place] [address] since your last visit? Include any pap smears or colon screening.  No

## 2021-07-16 DIAGNOSIS — E11.40 TYPE 2 DIABETES MELLITUS WITH DIABETIC NEUROPATHY, WITHOUT LONG-TERM CURRENT USE OF INSULIN (HCC): ICD-10-CM

## 2021-07-16 RX ORDER — METFORMIN HYDROCHLORIDE 500 MG/1
TABLET ORAL
Qty: 360 TABLET | Refills: 1 | Status: SHIPPED | OUTPATIENT
Start: 2021-07-16 | End: 2022-01-20 | Stop reason: SDUPTHER

## 2021-08-09 DIAGNOSIS — E29.1 MALE HYPOGONADISM: ICD-10-CM

## 2021-08-09 RX ORDER — TESTOSTERONE CYPIONATE 200 MG/ML
INJECTION INTRAMUSCULAR
Qty: 10 ML | Refills: 0 | Status: SHIPPED | OUTPATIENT
Start: 2021-08-09 | End: 2021-12-02

## 2021-09-28 ENCOUNTER — OFFICE VISIT (OUTPATIENT)
Dept: PODIATRY | Age: 56
End: 2021-09-28
Payer: COMMERCIAL

## 2021-09-28 VITALS
SYSTOLIC BLOOD PRESSURE: 135 MMHG | BODY MASS INDEX: 30.57 KG/M2 | HEART RATE: 63 BPM | WEIGHT: 206.4 LBS | DIASTOLIC BLOOD PRESSURE: 70 MMHG | HEIGHT: 69 IN

## 2021-09-28 DIAGNOSIS — D23.72 ECCRINE POROMA OF LEFT FOOT: Primary | ICD-10-CM

## 2021-09-28 PROCEDURE — 11305 SHAVE SKIN LESION 0.5 CM/<: CPT | Performed by: PODIATRIST

## 2021-09-28 PROCEDURE — 99203 OFFICE O/P NEW LOW 30 MIN: CPT | Performed by: PODIATRIST

## 2021-09-28 RX ORDER — DOCUSATE SODIUM 100 MG
100 CAPSULE ORAL DAILY
COMMUNITY
Start: 2021-09-20

## 2021-09-28 RX ORDER — OMEPRAZOLE 40 MG/1
40 CAPSULE, DELAYED RELEASE ORAL 2 TIMES DAILY
COMMUNITY
Start: 2021-07-15 | End: 2022-01-20 | Stop reason: SDUPTHER

## 2021-09-28 NOTE — PROGRESS NOTES
1. Have you been to the ER, urgent care clinic since your last visit? Hospitalized since your last visit? No    2. Have you seen or consulted any other health care providers outside of the [address] [place] [address] since your last visit? Include any pap smears or colon screening.  No      Chief Complaint   Patient presents with    Callouses     L foot painful corn

## 2021-09-29 NOTE — PROGRESS NOTES
[facility]    Subjective:         Patient is a 54 y.o. male who is being seen as a new pt for left foot pain. Patient states he suffered with the pain for the past few years. He states there is a painful lesion to the plantar aspect left foot. He states he has remove the lesion with a pocket knife many times but the lesion continues to return. He admits to pain, recent level of 4 out of 10 that is exacerbated with weightbearing. He denies any trauma. She denies any breaks in skin. Denies any local/systemic signs infection. He denies any changes in his activity level or shoe gear. He denies any other pedal complaints    Past Medical History:   Diagnosis Date    Acid reflux     Diabetes ([facility]     Diabetic neuropathy ([facility]     Hypercholesterolemia     Hypertension     Restless leg syndrome      Past Surgical History:   Procedure Laterality Date    HX ENDOSCOPY  02/01/2021       Family History   Problem Relation Age of Onset    Diabetes Mother     Heart Disease Mother     Heart Disease Father       Social History     Tobacco Use    Smoking status: Current Every Day Smoker     Packs/day: 1.00     Types: Cigarettes    Smokeless tobacco: Never Used   Substance Use Topics    Alcohol use: Not Currently     Allergies   Allergen Reactions    Aspirin Other (comments)     GI Upset    Pcn [Penicillins] Unknown (comments)     Family reported. Prior to Admission medications    Medication Sig Start Date End Date Taking? Authorizing Provider   omeprazole (PRILOSEC) 40 mg capsule Take 40 mg by mouth two (2) times a day. 7/15/21  Yes Provider, Historical   Stool Softener 100 mg capsule Take 100 mg by mouth daily.  9/20/21  Yes Provider, Historical   testosterone cypionate (DEPOTESTOTERONE CYPIONATE) 200 mg/mL injection INJECT 1 ML INTRAMUSCULARLY EVERY 14 DAYS; MAX DAILY AMOUNT 200 MG 8/9/21  Yes Kenya Turk, EMRE   metFORMIN (GLUCOPHAGE) 500 mg tablet TAKE 2 TABLETS BY MOUTH TWICE DAILY WITH MEALS 7/16/21  Yes Scott Cowart NP   albuterol (PROVENTIL HFA, VENTOLIN HFA, PROAIR HFA) 90 mcg/actuation inhaler Take 2 Puffs by inhalation every four (4) hours as needed for Wheezing. 6/30/21  Yes Joaquina Vazquez PA-C   gabapentin (NEURONTIN) 800 mg tablet TAKE ONE TABLET BY MOUTH THREE TIMES DAILY (max OF 2,400mg daily) 5/17/21  Yes Scott Cowart NP   syringe with needle (Syringe 3cc/61Ax8-6/2\") 3 mL 21 gauge x 1 1/2\" syrg For the administration of 1 mL testosterone every 2 weeks. Diagnosis code E29.1. 2/19/21  Yes Scott Cowart NP   hydroCHLOROthiazide (MICROZIDE) 12.5 mg capsule Take 1 Cap by mouth daily. 1/22/21  Yes Scott Cowart NP   metoprolol tartrate (LOPRESSOR) 100 mg IR tablet TAKE ONE TABLET BY MOUTH EVERY DAY 1/22/21  Yes Scott Cowart NP   rosuvastatin (CRESTOR) 5 mg tablet Take 1 Tab by mouth daily. 1/22/21  Yes Scott Cowart NP   buprenorphine-naloxone (SUBOXONE) 8-2 mg film sublingaul film dissolve 1 film under the tongue TWICE DAILY 11/19/19  Yes Provider, Historical       Review of Systems   Constitutional: Negative. HENT: Negative. Eyes: Negative. Respiratory: Negative. Cardiovascular: Negative. Gastrointestinal: Negative. Endocrine: Negative. Genitourinary: Negative. Musculoskeletal: Negative. Skin: Negative. Allergic/Immunologic: Positive for immunocompromised state. Neurological: Positive for numbness. Hematological: Negative. Psychiatric/Behavioral: Negative. All other systems reviewed and are negative. Objective:     Visit Vitals  /70 (BP 1 Location: Left upper arm, BP Patient Position: Sitting, BP Cuff Size: Adult)   Pulse 63   Ht 5' 9\" (1.753 m)   Wt 206 lb 6.4 oz (93.6 kg)   BMI 30.48 kg/m²       Physical Exam  Vitals reviewed. Constitutional:       Appearance: He is obese. Cardiovascular:      Pulses:           Dorsalis pedis pulses are 2+ on the right side and 2+ on the left side. Posterior tibial pulses are 2+ on the right side and 2+ on the left side. Pulmonary:      Effort: Pulmonary effort is normal.   Musculoskeletal:      Right lower leg: No edema. Left lower leg: No edema. Right foot: Normal range of motion. No deformity or bunion. Left foot: Normal range of motion. No deformity or bunion. Feet:      Right foot:      Protective Sensation: 10 sites tested. 10 sites sensed. Skin integrity: Skin integrity normal.      Toenail Condition: Right toenails are normal.      Left foot:      Protective Sensation: 10 sites tested. 10 sites sensed. Skin integrity: Callus present. Toenail Condition: Left toenails are normal.   Lymphadenopathy:      Lower Body: No right inguinal adenopathy. No left inguinal adenopathy. Skin:     General: Skin is warm. Capillary Refill: Capillary refill takes 2 to 3 seconds. Neurological:      Mental Status: He is alert and oriented to person, place, and time. Psychiatric:         Mood and Affect: Mood and affect normal.         Behavior: Behavior is cooperative. Data Review: No results found for this or any previous visit (from the past 24 hour(s)). Impression:       ICD-10-CM ICD-9-CM    1. Eccrine poroma of left foot  D23.72 216.7        Recommendation:     Patient seen and evaluated in the office  Discussed and educated patient regarding his current medical condition  Reviewed tx options, conservative vs procedural. Also reviewed possible complications. A painful raised hyper pigmented benign epidermal neoplasm was noted to the plantar aspect of the left. The lesion underwent a shave excision with a #15 blade, removing the entire lesion, with the greatest diameter of the lesion measuring 3 millimeters. Hemostasis and anesthesia as needed. An appropriate dressing was applied. Instructed patient if he is limited focal pressure and shear forces to prevent the lesion from returning.   Also encouraged spray antiperspirant daily to his feet to decrease perspiration. Patient verbalized understanding        Ammy Lares.  Beka Carl, [facility], [address] [place] [address] and Alex [address] [place] [address]  [address] [facility] [address], [address]  O: [phone]  F: [phone]  C: [phone]

## 2021-10-22 DIAGNOSIS — K21.00 GASTROESOPHAGEAL REFLUX DISEASE WITH ESOPHAGITIS WITHOUT HEMORRHAGE: ICD-10-CM

## 2021-10-22 RX ORDER — SUCRALFATE 1 G/1
TABLET ORAL
Qty: 180 TABLET | Refills: 1 | Status: SHIPPED | OUTPATIENT
Start: 2021-10-22 | End: 2022-07-21

## 2021-10-28 ENCOUNTER — TELEPHONE (OUTPATIENT)
Dept: FAMILY MEDICINE CLINIC | Age: 56
End: 2021-10-28

## 2021-10-28 RX ORDER — AZITHROMYCIN 250 MG/1
TABLET, FILM COATED ORAL
Qty: 6 TABLET | Refills: 0 | Status: SHIPPED | OUTPATIENT
Start: 2021-10-28 | End: 2021-11-02

## 2021-10-28 NOTE — TELEPHONE ENCOUNTER
Advised with pt's hx of elevated bp rec Coricidin Cough and Cold. Pt was upset with nurse and wanted an abx. Advised could be drainage,rec letting immune system try to fight before giving abx. Pt denies productive,fever,or chills.

## 2021-10-28 NOTE — TELEPHONE ENCOUNTER
Zpack sent to pharmacy on file. I recommend he do supportive measures for 2-3 more days before he fills and starts taking.

## 2021-12-02 DIAGNOSIS — E29.1 MALE HYPOGONADISM: ICD-10-CM

## 2021-12-02 RX ORDER — TESTOSTERONE CYPIONATE 200 MG/ML
INJECTION INTRAMUSCULAR
Qty: 10 ML | Refills: 0 | Status: SHIPPED | OUTPATIENT
Start: 2021-12-02 | End: 2022-04-08 | Stop reason: SDUPTHER

## 2021-12-21 ENCOUNTER — OFFICE VISIT (OUTPATIENT)
Dept: ORTHOPEDIC SURGERY | Age: 56
End: 2021-12-21
Payer: COMMERCIAL

## 2021-12-21 DIAGNOSIS — M25.511 CHRONIC RIGHT SHOULDER PAIN: ICD-10-CM

## 2021-12-21 DIAGNOSIS — M75.121 NONTRAUMATIC COMPLETE TEAR OF RIGHT ROTATOR CUFF: ICD-10-CM

## 2021-12-21 DIAGNOSIS — G89.29 CHRONIC RIGHT SHOULDER PAIN: ICD-10-CM

## 2021-12-21 DIAGNOSIS — M75.41 SHOULDER IMPINGEMENT, RIGHT: ICD-10-CM

## 2021-12-21 DIAGNOSIS — M25.511 ACUTE PAIN OF RIGHT SHOULDER: Primary | ICD-10-CM

## 2021-12-21 DIAGNOSIS — M19.011 ARTHRITIS OF RIGHT ACROMIOCLAVICULAR JOINT: ICD-10-CM

## 2021-12-21 DIAGNOSIS — M75.21 BICEPS TENDINITIS OF RIGHT SHOULDER: ICD-10-CM

## 2021-12-21 DIAGNOSIS — S43.003A SUBLUXATION OF TENDON OF LONG HEAD OF BICEPS: ICD-10-CM

## 2021-12-21 PROCEDURE — 99203 OFFICE O/P NEW LOW 30 MIN: CPT | Performed by: ORTHOPAEDIC SURGERY

## 2021-12-22 VITALS — WEIGHT: 205 LBS | HEIGHT: 68 IN | BODY MASS INDEX: 31.07 KG/M2

## 2021-12-22 NOTE — PROGRESS NOTES
Kelly De La Garza Sr. (: 1965) is a 64 y.o. male, patient, here for evaluation of the following chief complaint(s):  Shoulder Pain (right)       HPI:    He began having increased right shoulder pain approximately 10 years ago. The patient reports no specific injury and states that his pain came on suddenly. He describes his right shoulder pain now as severe, stabbing, aching, and intermittent. His right shoulder pain does not make it difficult for him to go to sleep and does wake him up from sleep. He has been experiencing some swelling in his right shoulder. The patient states that his right shoulder pain continues to get worse. He reports that lifting and lying in bed make his pain worse and rest makes his pain better. He has tried previous injections and formal therapy for his right shoulder. The patient does report having previous x-rays and an MRI performed on his right shoulder. He reports no previous or related right shoulder surgery. Right shoulder MRI results from an outside facility performed on 2019: Full-thickness supraspinatus tendon tear involves most of the tendon sparing anterior fibers. This tear may extend into the far anterior infraspinatus. Laminar intrasubstance tear of the proximal mid and posterior infraspinatus tendon. Partial tear superior lateral subscapularis tendon. Biceps long head tendon is partially subluxed into the tear. Moderate biceps long head tendinopathy. AC joint and acromial bursa is noted. Allergies   Allergen Reactions    Aspirin Other (comments)     GI Upset    Pcn [Penicillins] Unknown (comments)     Family reported. Current Outpatient Medications   Medication Sig    testosterone cypionate (DEPOTESTOTERONE CYPIONATE) 200 mg/mL injection INJECT 1 ML IN THE MUSCLE EVERY 14 DAYS( MAXIMUM DAILY AMOUNT IS 1 ML)    sucralfate (CARAFATE) 1 gram tablet TAKE 1 TABLET BY MOUTH TWICE DAILY AS NEEDED(GERD).  CRUSH INTO 3 TABLESPOONSFUL OF WATER TO MAKE LOOSE WATERY MIXTURE.  omeprazole (PRILOSEC) 40 mg capsule Take 40 mg by mouth two (2) times a day.  Stool Softener 100 mg capsule Take 100 mg by mouth daily.  metFORMIN (GLUCOPHAGE) 500 mg tablet TAKE 2 TABLETS BY MOUTH TWICE DAILY WITH MEALS    albuterol (PROVENTIL HFA, VENTOLIN HFA, PROAIR HFA) 90 mcg/actuation inhaler Take 2 Puffs by inhalation every four (4) hours as needed for Wheezing.  gabapentin (NEURONTIN) 800 mg tablet TAKE ONE TABLET BY MOUTH THREE TIMES DAILY (max OF 2,400mg daily)    syringe with needle (Syringe 3cc/97Xi2-0/2\") 3 mL 21 gauge x 1 1/2\" syrg For the administration of 1 mL testosterone every 2 weeks. Diagnosis code E29.1.  hydroCHLOROthiazide (MICROZIDE) 12.5 mg capsule Take 1 Cap by mouth daily.  metoprolol tartrate (LOPRESSOR) 100 mg IR tablet TAKE ONE TABLET BY MOUTH EVERY DAY    rosuvastatin (CRESTOR) 5 mg tablet Take 1 Tab by mouth daily.  buprenorphine-naloxone (SUBOXONE) 8-2 mg film sublingaul film dissolve 1 film under the tongue TWICE DAILY     No current facility-administered medications for this visit.        Past Medical History:   Diagnosis Date    Acid reflux     [facility] [address]     Diabetic neuropathy [facility] [address] [facility]     Hypercholesterolemia     Hypertension     Restless leg syndrome         Past Surgical History:   Procedure Laterality Date    HX ENDOSCOPY  02/01/2021       Family History   Problem Relation Age of Onset    Diabetes Mother     Heart Disease Mother     Heart Disease Father         Social History     Socioeconomic History    Marital status:      Spouse name: Not on file    Number of children: Not on file    Years of education: Not on file    Highest education level: Not on file   Occupational History    Not on file   Tobacco Use    Smoking status: Current Every Day Smoker     Packs/day: 1.00     Types: Cigarettes    Smokeless tobacco: Never Used   Vaping Use    Vaping Use: Never used   Substance and Sexual Activity    Alcohol use: Not Currently    Drug use: Not Currently    Sexual activity: Yes   Other Topics Concern    Not on file   Social History Narrative    Not on file     Social Determinants of Health     Financial Resource Strain:     Difficulty of Paying Living Expenses: Not on file   Food Insecurity:     Worried About Running Out of Food in the Last Year: Not on file    Adalid of Food in the Last Year: Not on file   Transportation Needs:     Lack of Transportation (Medical): Not on file    Lack of Transportation (Non-Medical): Not on file   Physical Activity:     Days of Exercise per Week: Not on file    Minutes of Exercise per Session: Not on file   Stress:     Feeling of Stress : Not on file   Social Connections:     Frequency of Communication with Friends and Family: Not on file    Frequency of Social Gatherings with Friends and Family: Not on file    Attends Muslim Services: Not on file    Active Member of [address] [place] [address] or Organizations: Not on file    Attends Club or Organization Meetings: Not on file    Marital Status: Not on file   Intimate Partner Violence:     Fear of Current or Ex-Partner: Not on file    Emotionally Abused: Not on file    Physically Abused: Not on file    Sexually Abused: Not on file   Housing Stability:     Unable to Pay for Housing in the Last Year: Not on file    Number of [place] in the Last Year: Not on file    Unstable Housing in the Last Year: Not on file       Review of Systems   All other systems reviewed and are negative. Vitals:  Ht 5' 8\" (1.727 m)   Wt 205 lb (93 kg)   BMI 31.17 kg/m²    Body mass index is 31.17 kg/m². Ortho Exam     The patient is well-developed and well-nourished. The patient presents today in alert and oriented x3 with a normal mood and affect. The patient stands with a normal weightbearing line and walks with a normal gait. Right shoulder: No shoulder girdle atrophy.   There is no soft tissue swelling, ecchymosis, abrasions, or lacerations. Active range of motion of the shoulder is limited to 130 degrees of forward flexion, 120 degrees of lateral abduction, and 70 degrees of external rotation. Internal rotation is to the SI joint and is painful. Passive range of motion is full with a painful impingement sign and painful Dobbs sign. Rotator cuff strength is painful to evaluate and is a 4+/5 with forward flexion and lateral abduction. External rotation strength is maintained. There is no crepitation about the joint. Palpation of the [facility] joint does reproduce discomfort and there is pain elicited with cross body abduction. Strength of the extremity is 5/5 strength at biceps/triceps/wrist extension and is comparable to the contralateral side. DTRs are intact at +2/4 and are symmetrical.  Cervical range of motion is full with no pain to palpation along the paraspinal musculature medial border of the scapula. Spurling's sign is negative. ASSESSMENT/PLAN:      1. Acute pain of right shoulder  -     XR SHOULDER RT AP/LAT MIN 2 V; Future  2. Chronic right shoulder pain  3. Nontraumatic complete tear of right rotator cuff  4. Shoulder impingement, right  5. Arthritis of right acromioclavicular joint  6. Subluxation of tendon of long head of biceps  7. Biceps tendinitis of right shoulder    XR Results (most recent):  Results from Appointment encounter on 12/21/21    XR SHOULDER RT AP/LAT MIN 2 V    Narrative  Right shoulder 3 view x-ray show no evidence of a fracture or dislocation. Evidence of impingement and acromioclavicular arthritis. Glenohumeral joint space is well-maintained. Below is the assessment and plan developed based on review of pertinent history, physical exam, labs, studies, and medications.     We discussed the patient's ongoing right shoulder pain and we reviewed his MRI images and results and they were consistent with Full-thickness supraspinatus tendon tear involves most of the tendon sparing anterior fibers. This tear may extend into the far anterior infraspinatus. Laminar intrasubstance tear of the proximal mid and posterior infraspinatus tendon. Partial tear superior lateral subscapularis tendon. Biceps long head tendon is partially subluxed into the tear. Moderate biceps long head tendinopathy. AC joint and acromial bursa is noted. The possible treatment options were discussed with the patient and because of the several year long duration of his increased pain, no improvement with multiple modalities of conservative management including an at-home exercise program, his physical exam, description of his pain, x-rays, past MRI images and results, and his inability to complete daily living activities without significant discomfort we both decided that surgical intervention was the best treatment plan. The risks and benefits of a right shoulder arthroscopic exam with rotator cuff repair, acromioplasty, distal clavicle resection, extensive debridement, and open biceps tenodesis were discussed in detail with the patient and he would like to proceed. We will schedule this at his convenience. In the interim, I did encourage him to ice when possible, modify his activity level based on his right shoulder pain, and use anti-inflammatory medication when necessary. The patient will also work on range of motion, strengthening, and stretching exercises with an at-home exercise program as pain tolerates. I will see him back in the office on an as-needed basis or on the day of his right shoulder surgery. Return for In the office as needed or on the day of his right shoulder surgery. An electronic signature was used to authenticate this note.   -- Charles Danielle MD

## 2022-01-10 ENCOUNTER — TELEPHONE (OUTPATIENT)
Dept: FAMILY MEDICINE CLINIC | Age: 57
End: 2022-01-10

## 2022-01-10 DIAGNOSIS — E29.1 MALE HYPOGONADISM: ICD-10-CM

## 2022-01-10 NOTE — TELEPHONE ENCOUNTER
Wife Cherl Bence ) calling about pt can not get needles for his testosterone.  Pt still have RX can pt come in and get his injection here do we have the needles they need if wife does not answer phone please leave a message

## 2022-01-10 NOTE — TELEPHONE ENCOUNTER
Left message on voicemail to call office back x2. Why is pt not able to get rx for needles-need an rx,insurance coverage,or oop cost.  Pt is able to come in office for nurse visit only for T injection-please consult with nurse for best time to work pt in.

## 2022-01-19 ENCOUNTER — DOCUMENTATION ONLY (OUTPATIENT)
Dept: FAMILY MEDICINE CLINIC | Age: 57
End: 2022-01-19

## 2022-01-19 DIAGNOSIS — I10 ESSENTIAL HYPERTENSION: ICD-10-CM

## 2022-01-19 RX ORDER — HYDROCHLOROTHIAZIDE 12.5 MG/1
12.5 CAPSULE ORAL DAILY
Qty: 90 CAPSULE | Refills: 3 | Status: SHIPPED | OUTPATIENT
Start: 2022-01-19

## 2022-01-20 ENCOUNTER — VIRTUAL VISIT (OUTPATIENT)
Dept: FAMILY MEDICINE CLINIC | Age: 57
End: 2022-01-20

## 2022-01-20 DIAGNOSIS — G56.00 CARPAL TUNNEL SYNDROME, UNSPECIFIED LATERALITY: ICD-10-CM

## 2022-01-20 DIAGNOSIS — K21.9 GASTROESOPHAGEAL REFLUX DISEASE, UNSPECIFIED WHETHER ESOPHAGITIS PRESENT: Primary | ICD-10-CM

## 2022-01-20 DIAGNOSIS — E11.40 TYPE 2 DIABETES MELLITUS WITH DIABETIC NEUROPATHY, WITHOUT LONG-TERM CURRENT USE OF INSULIN (HCC): ICD-10-CM

## 2022-01-20 DIAGNOSIS — I10 ESSENTIAL HYPERTENSION: ICD-10-CM

## 2022-01-20 DIAGNOSIS — E78.00 HYPERCHOLESTEROLEMIA: ICD-10-CM

## 2022-01-20 RX ORDER — METOPROLOL TARTRATE 100 MG/1
TABLET ORAL
Qty: 90 TABLET | Refills: 3 | Status: SHIPPED | OUTPATIENT
Start: 2022-01-20

## 2022-01-20 RX ORDER — GABAPENTIN 800 MG/1
TABLET ORAL
Qty: 270 TABLET | Refills: 1 | Status: SHIPPED | OUTPATIENT
Start: 2022-01-20

## 2022-01-20 RX ORDER — OMEPRAZOLE 40 MG/1
40 CAPSULE, DELAYED RELEASE ORAL DAILY
Qty: 90 CAPSULE | Refills: 1 | Status: SHIPPED | OUTPATIENT
Start: 2022-01-20

## 2022-01-20 RX ORDER — METFORMIN HYDROCHLORIDE 500 MG/1
TABLET ORAL
Qty: 360 TABLET | Refills: 1 | Status: SHIPPED | OUTPATIENT
Start: 2022-01-20 | End: 2022-01-26

## 2022-01-20 RX ORDER — ROSUVASTATIN CALCIUM 5 MG/1
5 TABLET, COATED ORAL DAILY
Qty: 90 TABLET | Refills: 3 | Status: SHIPPED | OUTPATIENT
Start: 2022-01-20 | End: 2022-04-26 | Stop reason: SDUPTHER

## 2022-01-20 NOTE — PROGRESS NOTES
Marni Ayala is a 64 y.o. male who was seen by synchronous (real-time) audio-video technology on 1/20/2022 for Diabetes, Hypertension, Cholesterol Problem, Labs, and Ear Pain        Assessment & Plan:   Diagnoses and all orders for this visit:    1. Gastroesophageal reflux disease, unspecified whether esophagitis present  -     omeprazole (PRILOSEC) 40 mg capsule; Take 1 Capsule by mouth daily. 2. Hypercholesterolemia  -     rosuvastatin (CRESTOR) 5 mg tablet; Take 1 Tablet by mouth daily. 3. Essential hypertension  -     metoprolol tartrate (LOPRESSOR) 100 mg IR tablet; TAKE ONE TABLET BY MOUTH EVERY DAY    4. Carpal tunnel syndrome, unspecified laterality  -     gabapentin (NEURONTIN) 800 mg tablet; TAKE ONE TABLET BY MOUTH THREE TIMES DAILY (max OF 2,400mg daily)    5. Type 2 diabetes mellitus with diabetic neuropathy, without long-term current use of insulin (HCC)  -     metFORMIN (GLUCOPHAGE) 500 mg tablet; TAKE 2 TABLETS BY MOUTH TWICE DAILY WITH MEALS        I spent at least 5 minutes on this visit with this established patient. [id number]  Subjective:     Chief Complaint   Patient presents with    Diabetes    Hypertension    Cholesterol Problem    Labs    Ear Pain     Patient vv appt today for f/u and labs. Pt need med refills on all medications. Have c/o of left ear pain that began couple wks ago. Pt denies congestion or cough. Treated with peroxide and ear flush. Pt has not had fasting labs in the last year. Insurance is no longer covering his prilosec. Will refill meds and schedule him for an in office appt so that we can update all of his VS, HM and labs. Will also be able to check his ears at that time. No LOS for visit today.

## 2022-01-20 NOTE — PROGRESS NOTES
Chief Complaint   Patient presents with    Diabetes    Hypertension    Cholesterol Problem    Labs    Ear Pain     Patient vv appt today for f/u and labs. Pt need med refills on all medications. Have c/o of left ear pain that began couple wks ago. Pt denies congestion or cough. Treated with peroxide and ear flush. 1. Have you been to the ER, urgent care clinic since your last visit? Hospitalized since your last visit? No    2. Have you seen or consulted any other health care providers outside of the [address] [place] [address] since your last visit? Include any pap smears or colon screening.  No

## 2022-01-26 DIAGNOSIS — E11.40 TYPE 2 DIABETES MELLITUS WITH DIABETIC NEUROPATHY, WITHOUT LONG-TERM CURRENT USE OF INSULIN (HCC): ICD-10-CM

## 2022-01-26 RX ORDER — METFORMIN HYDROCHLORIDE 500 MG/1
TABLET ORAL
Qty: 360 TABLET | Refills: 1 | Status: SHIPPED | OUTPATIENT
Start: 2022-01-26 | End: 2022-05-18

## 2022-02-17 DIAGNOSIS — M75.121 NONTRAUMATIC COMPLETE TEAR OF RIGHT ROTATOR CUFF: Primary | ICD-10-CM

## 2022-02-18 RX ORDER — OXYCODONE AND ACETAMINOPHEN 5; 325 MG/1; MG/1
1 TABLET ORAL
Qty: 40 TABLET | Refills: 0 | Status: SHIPPED | OUTPATIENT
Start: 2022-02-18 | End: 2022-02-25 | Stop reason: SDUPTHER

## 2022-02-25 DIAGNOSIS — M75.121 NONTRAUMATIC COMPLETE TEAR OF RIGHT ROTATOR CUFF: ICD-10-CM

## 2022-02-26 RX ORDER — OXYCODONE AND ACETAMINOPHEN 5; 325 MG/1; MG/1
1 TABLET ORAL
Qty: 40 TABLET | Refills: 0 | Status: SHIPPED | OUTPATIENT
Start: 2022-02-26 | End: 2022-03-05

## 2022-03-01 ENCOUNTER — OFFICE VISIT (OUTPATIENT)
Dept: ORTHOPEDIC SURGERY | Age: 57
End: 2022-03-01

## 2022-03-01 DIAGNOSIS — M25.511 PAIN OF RIGHT SHOULDER REGION: Primary | ICD-10-CM

## 2022-03-01 DIAGNOSIS — Z98.890 STATUS POST SHOULDER SURGERY: ICD-10-CM

## 2022-03-01 PROCEDURE — 99024 POSTOP FOLLOW-UP VISIT: CPT | Performed by: ORTHOPAEDIC SURGERY

## 2022-03-01 NOTE — PROGRESS NOTES
Ronnell Badillo Sr. (: 1965) is a 64 y.o. male, patient, here for evaluation of the following chief complaint(s):  Surgical Follow-up and Shoulder Pain (right)       HPI:    He is now 7 days status post right shoulder arthroscopic exam with rotator cuff repair, acromioplasty, distal clavicle resection, extensive debridement, and open biceps tenodesis. His surgery was performed on 2022. The patient rates the severity of his postoperative right shoulder pain is a 2 out of 10. He describes his pain as aching, burning, and intermittent. His postoperative right shoulder pain does make it difficult for him to go to sleep and does wake him up from sleep. He has been experiencing some postoperative bruising which is to be expected. The patient has been taking narcotic pain medication for his discomfort as needed. He has been wearing his postoperative immobilizer as instructed. Allergies   Allergen Reactions    Aspirin Other (comments)     GI Upset    Pcn [Penicillins] Unknown (comments)     Family reported. Current Outpatient Medications   Medication Sig    oxyCODONE-acetaminophen (Percocet) 5-325 mg per tablet Take 1 Tablet by mouth every four to six (4-6) hours as needed for Pain for up to 7 days. Max Daily Amount: 6 Tablets.  metFORMIN (GLUCOPHAGE) 500 mg tablet TAKE 2 TABLETS BY MOUTH TWICE DAILY WITH MEALS    rosuvastatin (CRESTOR) 5 mg tablet Take 1 Tablet by mouth daily.  metoprolol tartrate (LOPRESSOR) 100 mg IR tablet TAKE ONE TABLET BY MOUTH EVERY DAY    gabapentin (NEURONTIN) 800 mg tablet TAKE ONE TABLET BY MOUTH THREE TIMES DAILY (max OF 2,400mg daily)    omeprazole (PRILOSEC) 40 mg capsule Take 1 Capsule by mouth daily.  hydroCHLOROthiazide (MICROZIDE) 12.5 mg capsule Take 1 Capsule by mouth daily.     testosterone cypionate (DEPOTESTOTERONE CYPIONATE) 200 mg/mL injection INJECT 1 ML IN THE MUSCLE EVERY 14 DAYS( MAXIMUM DAILY AMOUNT IS 1 ML)    sucralfate (CARAFATE) 1 gram tablet TAKE 1 TABLET BY MOUTH TWICE DAILY AS NEEDED(GERD). CRUSH INTO 3 TABLESPOONSFUL OF WATER TO MAKE LOOSE WATERY MIXTURE.  Stool Softener 100 mg capsule Take 100 mg by mouth daily.  albuterol (PROVENTIL HFA, VENTOLIN HFA, PROAIR HFA) 90 mcg/actuation inhaler Take 2 Puffs by inhalation every four (4) hours as needed for Wheezing.  syringe with needle (Syringe 3cc/33Nn4-9/2\") 3 mL 21 gauge x 1 1/2\" syrg For the administration of 1 mL testosterone every 2 weeks. Diagnosis code E29.1.  buprenorphine-naloxone (SUBOXONE) 8-2 mg film sublingaul film dissolve 1 film under the tongue TWICE DAILY     No current facility-administered medications for this visit.        Past Medical History:   Diagnosis Date    Acid reflux     Diabetes ([facility] [address]     Diabetic neuropathy ([facility] [address]     Hypercholesterolemia     Hypertension     Restless leg syndrome         Past Surgical History:   Procedure Laterality Date    HX ENDOSCOPY  02/01/2021       Family History   Problem Relation Age of Onset    Diabetes Mother     Heart Disease Mother     Heart Disease Father         Social History     Socioeconomic History    Marital status:      Spouse name: Not on file    Number of children: Not on file    Years of education: Not on file    Highest education level: Not on file   Occupational History    Not on file   Tobacco Use    Smoking status: Current Every Day Smoker     Packs/day: 1.00     Types: Cigarettes    Smokeless tobacco: Never Used   Vaping Use    Vaping Use: Never used   Substance and Sexual Activity    Alcohol use: Not Currently    Drug use: Not Currently    Sexual activity: Yes   Other Topics Concern    Not on file   Social History Narrative    Not on file     Social Determinants of Health     Financial Resource Strain:     Difficulty of Paying Living Expenses: Not on file   Food Insecurity:     Worried About Running Out of Food in the Last Year: Not on file    Adalid of Food in the Last Year: Not on file   Transportation Needs:     Lack of Transportation (Medical): Not on file    Lack of Transportation (Non-Medical): Not on file   Physical Activity:     Days of Exercise per Week: Not on file    Minutes of Exercise per Session: Not on file   Stress:     Feeling of Stress : Not on file   Social Connections:     Frequency of Communication with Friends and Family: Not on file    Frequency of Social Gatherings with Friends and Family: Not on file    Attends Evangelical Services: Not on file    Active Member of [address] [place] [address] [place] or Organizations: Not on file    Attends Club or Organization Meetings: Not on file    Marital Status: Not on file   Intimate Partner Violence:     Fear of Current or Ex-Partner: Not on file    Emotionally Abused: Not on file    Physically Abused: Not on file    Sexually Abused: Not on file   Housing Stability:     Unable to Pay for Housing in the Last Year: Not on file    Number of [place] in the Last Year: Not on file    Unstable Housing in the Last Year: Not on file       Review of Systems   All other systems reviewed and are negative. Vitals: There were no vitals taken for this visit. There is no height or weight on file to calculate BMI. Ortho Exam     The patient is well-developed and well-nourished. The patient presents today in alert and oriented x3 with a normal mood and affect. The patient stands with a normal weightbearing line and walks with a normal gait. Right shoulder wounds are clean and neurovascularly intact. There is some ecchymosis but no erythema. His stitches were removed and his incisions are healing nicely with no sign of irritation or infection and look normal.  He does have good early elbow and wrist range of motion. His shoulder range of motion and strength were not tested today. His sensations are intact and his pulses are 2+. His skin is healing nicely. ASSESSMENT/PLAN:      1.  Pain of right shoulder region  2. Status post shoulder surgery       Below is the assessment and plan developed based on review of pertinent history, physical exam, labs, studies, and medications. We discussed the patient's recent right shoulder arthroscopic exam with rotator cuff repair, acromioplasty, distal clavicle resection, extensive debridement, and open biceps tenodesis. His surgery was performed on 2/21/2022. He is now 7 days status post surgical intervention. The patient is progressing nicely in the early stages of his recovery and having the appropriate amount of expected postoperative discomfort at this time. We did remove the patient stitches today in the office without complication. His incisions are healing nicely with no sign of irritation or infection and look normal.  He does have good early elbow range of motion. His shoulder range of motion and strength were not tested today. The patient will continue the postoperative protocol by remaining in his postoperative immobilizer as instructed. We did remove his abduction pillow today. He will work on passive only range of motion exercises with an at-home exercise program as pain tolerates. He is to avoid any active range of motion at this time. He is also to avoid any lifting with his right upper extremity, power biceps activities, internal and external supination, and reaching behind him with his arm fully extended. I did encourage him to ice when possible, modify his activity level based on his postoperative right shoulder and arm pain, and use anti-inflammatory medication when necessary. I will see him back in 2 weeks for reevaluation and further discussion of the postoperative protocol    Return in about 2 weeks (around 3/15/2022) for Reevaluation and further discussion of the postop protocol. An electronic signature was used to authenticate this note.   -- Ann Garcia MD

## 2022-03-09 NOTE — PROGRESS NOTES
Daniel Hopson Sr. (: 1965) is a 64 y.o. male, patient, here for evaluation of the following chief complaint(s):  Surgical Follow-up and Shoulder Pain (right)       HPI:    He is now approximately 2-1/2 weeks status post right shoulder arthroscopic exam with rotator cuff repair, acromioplasty, distal clavicle resection, extensive debridement, and open biceps tenodesis. His surgery was performed on 2022. He was last seen on 3/1/2022. The patient states that his pain level has improved since his last visit and surgical procedure. He gives no detail or description of his discomfort. He has been wearing his postoperative immobilizer as instructed. He has been taking medication for his pain as needed. Allergies   Allergen Reactions    Aspirin Other (comments)     GI Upset    Pcn [Penicillins] Unknown (comments)     Family reported. Current Outpatient Medications   Medication Sig    metFORMIN (GLUCOPHAGE) 500 mg tablet TAKE 2 TABLETS BY MOUTH TWICE DAILY WITH MEALS    rosuvastatin (CRESTOR) 5 mg tablet Take 1 Tablet by mouth daily.  metoprolol tartrate (LOPRESSOR) 100 mg IR tablet TAKE ONE TABLET BY MOUTH EVERY DAY    gabapentin (NEURONTIN) 800 mg tablet TAKE ONE TABLET BY MOUTH THREE TIMES DAILY (max OF 2,400mg daily)    omeprazole (PRILOSEC) 40 mg capsule Take 1 Capsule by mouth daily.  hydroCHLOROthiazide (MICROZIDE) 12.5 mg capsule Take 1 Capsule by mouth daily.  testosterone cypionate (DEPOTESTOTERONE CYPIONATE) 200 mg/mL injection INJECT 1 ML IN THE MUSCLE EVERY 14 DAYS( MAXIMUM DAILY AMOUNT IS 1 ML)    sucralfate (CARAFATE) 1 gram tablet TAKE 1 TABLET BY MOUTH TWICE DAILY AS NEEDED(GERD). CRUSH INTO 3 TABLESPOONSFUL OF WATER TO MAKE LOOSE WATERY MIXTURE.  Stool Softener 100 mg capsule Take 100 mg by mouth daily.  albuterol (PROVENTIL HFA, VENTOLIN HFA, PROAIR HFA) 90 mcg/actuation inhaler Take 2 Puffs by inhalation every four (4) hours as needed for Wheezing.     syringe with needle (Syringe 3cc/66Ga0-0/2\") 3 mL 21 gauge x 1 1/2\" syrg For the administration of 1 mL testosterone every 2 weeks. Diagnosis code E29.1.  buprenorphine-naloxone (SUBOXONE) 8-2 mg film sublingaul film dissolve 1 film under the tongue TWICE DAILY     No current facility-administered medications for this visit. Past Medical History:   Diagnosis Date    Acid reflux     Diabetes ([facility] [address] [facility]     Diabetic neuropathy ([facility] [facility]     Hypercholesterolemia     Hypertension     Restless leg syndrome         Past Surgical History:   Procedure Laterality Date    HX ENDOSCOPY  02/01/2021       Family History   Problem Relation Age of Onset    Diabetes Mother     Heart Disease Mother     Heart Disease Father         Social History     Socioeconomic History    Marital status:      Spouse name: Not on file    Number of children: Not on file    Years of education: Not on file    Highest education level: Not on file   Occupational History    Not on file   Tobacco Use    Smoking status: Current Every Day Smoker     Packs/day: 1.00     Types: Cigarettes    Smokeless tobacco: Never Used   Vaping Use    Vaping Use: Never used   Substance and Sexual Activity    Alcohol use: Not Currently    Drug use: Not Currently    Sexual activity: Yes   Other Topics Concern    Not on file   Social History Narrative    Not on file     Social Determinants of Health     Financial Resource Strain:     Difficulty of Paying Living Expenses: Not on file   Food Insecurity:     Worried About Running Out of Food in the Last Year: Not on file    Adal[place] of Food in the Last Year: Not on file   Transportation Needs:     Lack of Transportation (Medical): Not on file    Lack of Transportation (Non-Medical):  Not on file   Physical Activity:     Days of Exercise per Week: Not on file    Minutes of Exercise per Session: Not on file   Stress:     Feeling of Stress : Not on file   Social Connections:     Frequency of Communication with Friends and Family: Not on file    Frequency of Social Gatherings with Friends and Family: Not on file    Attends Worship Services: Not on file    Active Member of Clubs or Organizations: Not on file    Attends Club or Organization Meetings: Not on file    Marital Status: Not on file   Intimate Partner Violence:     Fear of Current or Ex-Partner: Not on file    Emotionally Abused: Not on file    Physically Abused: Not on file    Sexually Abused: Not on file   Housing Stability:     Unable to Pay for Housing in the Last Year: Not on file    Number of [place] in the Last Year: Not on file    Unstable Housing in the Last Year: Not on file       Review of Systems   All other systems reviewed and are negative. Vitals:  Ht 5' 8\" (1.727 m)   Wt 205 lb (93 kg)   BMI 31.17 kg/m²    Body mass index is 31.17 kg/m². Ortho Exam     The patient is well-developed and well-nourished. The patient presents today in alert and oriented x3 with a normal mood and affect. The patient stands with a normal weightbearing line and walks with a normal gait. Right shoulder wounds are clean and dry neurovascular intact. There is no ecchymosis or erythema. His incisions are well-healed with no sign of irritation or infection and look normal.  He does have well-maintained elbow and wrist range of motion. His passive shoulder range of motion has improved since his last visit. He has approximately 90 degrees of passive forward flexion and 70 degrees of passive abduction. There is limitation in all planes secondary to his postoperative discomfort which is normal and to be expected. His strength was again not tested today. His sensations are intact his pulses are 2+. His skin is well-healed. ASSESSMENT/PLAN:      1. Pain of right shoulder region  2.  Status post shoulder surgery  -     REFERRAL TO PHYSICAL THERAPY       Below is the assessment and plan developed based on review of pertinent history, physical exam, labs, studies, and medications. **At 5 weeks postop, the patient will start attending formal physical therapy. **    We discussed the patient's right shoulder arthroscopic exam with rotator cuff repair, acromioplasty, distal clavicle resection, extensive debridement, and open biceps tenodesis. His surgery was performed on 2/21/2022. He is now approximately 2-1/2 weeks status post surgical intervention. The patient continues to progress nicely in his recovery. He is having the appropriate amount of expected postoperative discomfort at this time. His incisions are well-healed with no sign of irritation or infection and look normal.  His passive range of motion continues to improve nicely. His strength was again not tested today. The patient will continue the postoperative protocol by remaining in his immobilizer as instructed. He will continue to work on passive only range of motion exercises with an at-home exercise program until he is 5 weeks status post surgical intervention. At 5 weeks postop, the patient may start working on active and active assist range of motion, strengthening, and stretching exercises at both formal physical therapy and with an at-home exercise program as pain tolerates. Until that time, he is to avoid any active range of motion. He is also still to avoid any significant lifting with his right upper extremity, power biceps activities, internal and external supination, and reaching behind and with his arm fully extended. I did encourage him to ice when possible, modify his activity level based on his postoperative right shoulder pain, and use anti-inflammatory medication when necessary. I will see him back in 4 weeks for reevaluation and further discussion of the postoperative protocol. Return in about 3 weeks (around 3/31/2022) for Re-evaluation and further discussion of the postop protocol.     An electronic signature was used to authenticate this note.   -- Ann Garcia MD

## 2022-03-10 ENCOUNTER — OFFICE VISIT (OUTPATIENT)
Dept: ORTHOPEDIC SURGERY | Age: 57
End: 2022-03-10
Payer: COMMERCIAL

## 2022-03-10 VITALS — WEIGHT: 205 LBS | BODY MASS INDEX: 31.07 KG/M2 | HEIGHT: 68 IN

## 2022-03-10 DIAGNOSIS — M25.511 PAIN OF RIGHT SHOULDER REGION: Primary | ICD-10-CM

## 2022-03-10 DIAGNOSIS — Z98.890 STATUS POST SHOULDER SURGERY: ICD-10-CM

## 2022-03-10 PROCEDURE — 99024 POSTOP FOLLOW-UP VISIT: CPT | Performed by: ORTHOPAEDIC SURGERY

## 2022-03-15 ENCOUNTER — APPOINTMENT (OUTPATIENT)
Dept: PHYSICAL THERAPY | Age: 57
End: 2022-03-15
Payer: COMMERCIAL

## 2022-03-19 PROBLEM — Z86.16 HISTORY OF COVID-19: Status: ACTIVE | Noted: 2021-07-13

## 2022-03-28 NOTE — PROGRESS NOTES
Jori Chamberlain Sr. (: 1965) is a 64 y.o. male, patient, here for evaluation of the following chief complaint(s):  Surgical Follow-up (sx on 22) and Shoulder Pain (right)       HPI:    He is now approximately 5 weeks status post right shoulder arthroscopic exam with rotator cuff repair, acromioplasty, distal clavicle resection, extensive debridement, and open biceps tenodesis.  His surgery was performed on 2022. He was last seen on 3/10/2022. The patient states that his pain level has improved since his last visit and surgical procedure. He gives no detail or description of his postoperative discomfort. The patient has been wearing his postoperative immobilizer as instructed. He has been working on passive only range of motion exercises with an at-home exercise program.  Patient has been moving his shoulder actively which was not advised up until this point in his recovery. Allergies   Allergen Reactions    Aspirin Other (comments)     GI Upset    Pcn [Penicillins] Unknown (comments)     Family reported. Current Outpatient Medications   Medication Sig    metFORMIN (GLUCOPHAGE) 500 mg tablet TAKE 2 TABLETS BY MOUTH TWICE DAILY WITH MEALS    rosuvastatin (CRESTOR) 5 mg tablet Take 1 Tablet by mouth daily.  metoprolol tartrate (LOPRESSOR) 100 mg IR tablet TAKE ONE TABLET BY MOUTH EVERY DAY    gabapentin (NEURONTIN) 800 mg tablet TAKE ONE TABLET BY MOUTH THREE TIMES DAILY (max OF 2,400mg daily)    omeprazole (PRILOSEC) 40 mg capsule Take 1 Capsule by mouth daily.  hydroCHLOROthiazide (MICROZIDE) 12.5 mg capsule Take 1 Capsule by mouth daily.  testosterone cypionate (DEPOTESTOTERONE CYPIONATE) 200 mg/mL injection INJECT 1 ML IN THE MUSCLE EVERY 14 DAYS( MAXIMUM DAILY AMOUNT IS 1 ML)    sucralfate (CARAFATE) 1 gram tablet TAKE 1 TABLET BY MOUTH TWICE DAILY AS NEEDED(GERD). CRUSH INTO 3 TABLESPOONSFUL OF WATER TO MAKE LOOSE WATERY MIXTURE.     Stool Softener 100 mg capsule Take 100 mg by mouth daily.  albuterol (PROVENTIL HFA, VENTOLIN HFA, PROAIR HFA) 90 mcg/actuation inhaler Take 2 Puffs by inhalation every four (4) hours as needed for Wheezing.  syringe with needle (Syringe 3cc/36Cb9-4/2\") 3 mL 21 gauge x 1 1/2\" syrg For the administration of 1 mL testosterone every 2 weeks. Diagnosis code E29.1.  buprenorphine-naloxone (SUBOXONE) 8-2 mg film sublingaul film dissolve 1 film under the tongue TWICE DAILY     No current facility-administered medications for this visit. Past Medical History:   Diagnosis Date    Acid reflux     Diabetes ([facility] [address]     Diabetic neuropathy ([facility] [address]     Hypercholesterolemia     Hypertension     Restless leg syndrome         Past Surgical History:   Procedure Laterality Date    HX ENDOSCOPY  02/01/2021       Family History   Problem Relation Age of Onset    Diabetes Mother     Heart Disease Mother     Heart Disease Father         Social History     Socioeconomic History    Marital status:      Spouse name: Not on file    Number of children: Not on file    Years of education: Not on file    Highest education level: Not on file   Occupational History    Not on file   Tobacco Use    Smoking status: Current Every Day Smoker     Packs/day: 1.00     Types: Cigarettes    Smokeless tobacco: Never Used   Vaping Use    Vaping Use: Never used   Substance and Sexual Activity    Alcohol use: Not Currently    Drug use: Not Currently    Sexual activity: Yes   Other Topics Concern    Not on file   Social History Narrative    Not on file     Social Determinants of Health     Financial Resource Strain:     Difficulty of Paying Living Expenses: Not on file   Food Insecurity:     Worried About Running Out of Food in the Last Year: Not on file    Adalid of Food in the Last Year: Not on file   Transportation Needs:     Lack of Transportation (Medical): Not on file    Lack of Transportation (Non-Medical):  Not on file   Physical Activity:  Days of Exercise per Week: Not on file    Minutes of Exercise per Session: Not on file   Stress:     Feeling of Stress : Not on file   Social Connections:     Frequency of Communication with Friends and Family: Not on file    Frequency of Social Gatherings with Friends and Family: Not on file    Attends Jainism Services: Not on file    Active Member of [address] [place] [address] or Organizations: Not on file    Attends Club or Organization Meetings: Not on file    Marital Status: Not on file   Intimate Partner Violence:     Fear of Current or Ex-Partner: Not on file    Emotionally Abused: Not on file    Physically Abused: Not on file    Sexually Abused: Not on file   Housing Stability:     Unable to Pay for Housing in the Last Year: Not on file    Number of [place] in the Last Year: Not on file    Unstable Housing in the Last Year: Not on file       Review of Systems   All other systems reviewed and are negative. Vitals: There were no vitals taken for this visit. There is no height or weight on file to calculate BMI. Ortho Exam     The patient is well-developed and well-nourished. The patient presents today in alert and oriented x3 with a normal mood and affect. The patient stands with a normal weightbearing line and walks with a normal gait. Right shoulder wounds are clean and dry neurovascular intact. There is no ecchymosis or erythema. His incisions are well-healed with no sign of irritation or infection and look normal.  He does have full elbow and wrist range of motion. His passive shoulder range of motion continues to improve. His active and active assist range of motion also continue to improve. He has approximately 160 degrees of passive forward flexion and 90 degrees of passive abduction. He has approximately 90 degrees active forward flexion and 60 degrees of active abduction. ASSESSMENT/PLAN:      1. Pain of right shoulder region  2.  Status post shoulder surgery  - REFERRAL TO PHYSICAL THERAPY       Below is the assessment and plan developed based on review of pertinent history, physical exam, labs, studies, and medications. **The patient was referred to formal physical therapy. **    We discussed the patient's right shoulder arthroscopic exam with rotator cuff repair, acromioplasty, distal clavicle resection, extensive debridement, and open biceps tenodesis.  His surgery was performed on 2/21/2022. He is now approximately 5 weeks status post surgical intervention. The patient continues to progress nicely in his recovery. His pain is intermittent and well controlled. His range of motion and strength both continue to improve. The patient will continue the postoperative protocol by working on range of motion, strengthening, and stretching exercises at both formal physical therapy and with an at-home exercise program as pain tolerates. He may continue to increase activities as tolerated and as discussed today in the office. He is still to avoid any external rotation activities, overhead activities, and significant power biceps activities. He may discontinue use of his postoperative immobilizer at this time. The patient will continue to ice when possible, modify his activity level based on his postoperative right shoulder and arm pain, and use anti-inflammatory medication when necessary. I will see him back in 3 weeks for reevaluation and further discussion of the postoperative protocol. Return in about 3 weeks (around 4/19/2022) for Reevaluation and further discussion of the postoperative protocol. An electronic signature was used to authenticate this note.   -- Jelly Garrett MD

## 2022-03-29 ENCOUNTER — OFFICE VISIT (OUTPATIENT)
Dept: ORTHOPEDIC SURGERY | Age: 57
End: 2022-03-29
Payer: COMMERCIAL

## 2022-03-29 DIAGNOSIS — Z98.890 STATUS POST SHOULDER SURGERY: ICD-10-CM

## 2022-03-29 DIAGNOSIS — M25.511 PAIN OF RIGHT SHOULDER REGION: Primary | ICD-10-CM

## 2022-03-29 PROCEDURE — 99024 POSTOP FOLLOW-UP VISIT: CPT | Performed by: ORTHOPAEDIC SURGERY

## 2022-03-30 ENCOUNTER — HOSPITAL ENCOUNTER (OUTPATIENT)
Dept: PHYSICAL THERAPY | Age: 57
Discharge: HOME | End: 2022-03-30
Payer: COMMERCIAL

## 2022-03-30 PROCEDURE — 97161 PT EVAL LOW COMPLEX 20 MIN: CPT
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
## 2022-03-31 NOTE — PROGRESS NOTES
[address]  [place], [place]  Ph: [phone]    Fax: [phone]    Plan of Care/Statement of Necessity for Physical Therapy Services  2-15        Patient name: Chi Maxwell Sr.  : 1965  Provider#: [id number]  Referral source: Antoinette Ny MD      Medical/Treatment Diagnosis: Status post shoulder surgery [Z98.890]     Prior Hospitalization: see medical history     Comorbidities: see medical history  Prior Level of Function: Independent with all ADL's; no use of AD  Medications: Verified on Patient Summary List    Start of Care: 3/31/2022      Onset Date: 22       The Plan of Care and following information is based on the information from the initial evaluation. Assessment/ key information: 64year old white male referred to Physical Therapy following right RTC repair surgery on  22. Patient is 6 week post-op surgery and prescription is to progress to [facility] and AROM. Patient presents decrease range of motion and strength of the right shoulder in each plane. 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 Patient will benefit from skilled Physical Therapy to regain active movements and  Regain strength for returning to work. Physical Therapy interventions will be provided as needed for inflammation.      Evaluation Complexity History LOW Complexity : Zero comorbidities / personal factors that will impact the outcome / POC; Examination LOW Complexity : 1-2 Standardized tests and measures addressing body structure, function, activity limitation and / or participation in recreation  ;Presentation LOW Complexity : Stable, uncomplicated  ;Clinical Decision Making TUG Score: 8 seconds  Overall Complexity Rating: LOW     Problem List: pain affecting function, decrease ROM, decrease strength, decrease ADL/ functional abilitiies and decrease activity tolerance   Treatment Plan may include any combination of the following: Therapeutic exercise, Neuromuscular re-education, Physical agent/modality, Manual therapy, Patient education, Self Care training and Functional mobility training  Patient / Family readiness to learn indicated by: asking questions  Persons(s) to be included in education: patient (P)  Barriers to Learning/Limitations: None  Patient Goal (s): To be able move my arm  Patient Self Reported Health Status: fair  Rehabilitation Potential: good    Short Term Goals: To be accomplished in 6  treatments. To perform HEP for increasing PROM of right shoulder to within full range to maintain mobility  Patient will be able to tree and doff clothing overhead with no difficulty. .    Long Term Goals: To be accomplished in 12  Treatments. Patient will be able to place light object overhead on shelf with little difficulty. Patient will be able to place had on head with right hand with no difficulty. Patient will be able to perform light job task     Frequency / Duration: Patient to be seen  2  times per week for 12  treatments. Patient/ Caregiver education and instruction: exercises    [x]  Plan of care has been reviewed with MARLON Melara, PT 3/31/2022     ________________________________________________________________________    I certify that the above Therapy Services are being furnished while the patient is under my care. I agree with the treatment plan and certify that this therapy is necessary.     Physician's Signature:____________________  Date:____________Time: _________    Patient name: Landry Dominguez   : 1965  Provider#: [id number]

## 2022-04-05 ENCOUNTER — HOSPITAL ENCOUNTER (OUTPATIENT)
Dept: PHYSICAL THERAPY | Age: 57
Discharge: HOME | End: 2022-04-05
Payer: COMMERCIAL

## 2022-04-05 PROCEDURE — 97110 THERAPEUTIC EXERCISES: CPT

## 2022-04-05 PROCEDURE — 97016 VASOPNEUMATIC DEVICE THERAPY: CPT

## 2022-04-05 NOTE — PROGRESS NOTES
PT DAILY TREATMENT NOTE 2-15    Patient Name: Juana Rajan  : 1965  [x]  Patient  Verified  Payor: Kari  / Plan: [address] / Product Type: Managed Care Medicaid /    In time:3:40 PM   Out time:4:20 PM  Total Treatment Time (min): 40  Visit #:  2    Treatment Area: Status post shoulder surgery [Z98.890]    SUBJECTIVE  Pain Level (0-10 scale): 2/10  Any medication changes, allergies to medications, adverse drug reactions, diagnosis change, or new procedure performed?: [x] No    [] Yes (see summary sheet for update)  Subjective functional status/changes:   [] No changes reported   I would like to try to do some painting. I use the zero turn on  and my shoulder felt better. OBJECTIVE      Modality rationale: decrease edema, decrease inflammation and decrease pain to improve the patients ability to return to work chores.    Min Type Additional Details       [] Estim: []Att   []Unatt    []TENS instruct                  []IFC  []Premod   []NMES                    []Other:  []w/US      []w/ heat  []w/ ice  Position:  Location:       []  Traction: [] Cervical       []Lumbar                       [] Prone          []Supine                       []Intermittent   []Continuous Lbs:  [] before manual  [] after manual  [] w/ heat  [] Simultaneously performed with w/ Estim    []  Ultrasound: []Continuous   [] Pulsed                       at: []1MHz   []3MHz Location:  W/cm2:    [] Paraffin         Location:   []w/heat    []  Ice     []  Heat  []  Ice massage Position:  Location:    []  Laser  []  Other: Position:  Location:     10 [x]  Vasopneumatic Device Pressure:       [x] lo [] med [] hi   [x] w/ ice      Temperature: 34  [] Simultaneously performed with w/ Estim     [x] Skin assessment post-treatment:  [x]intact []redness- no adverse reaction    []redness - adverse reaction:       30 min Therapeutic Exercise:  [x] See flow sheet :   Rationale: increase ROM to improve the patients ability to return to work chores. With   [] TE   [] TA   [] neuro   [] other: Patient Education: [x] Review HEP    [] Progressed/Changed HEP based on:   [] positioning   [] body mechanics   [] transfers   [] heat/ice application    [] other:      Other Objective/Functional Measures: Passive ROM and AAROM to right shoulder     Pain Level (0-10 scale) post treatment: 2/10    ASSESSMENT/Changes in Function:   Patient tolerated treatment. Patient was advised not to use right arm during work chores from the elbow up to shoulder. 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 Prescription is for gradual returning to [facility]. Would like to achieve full PROM and progress according to RTC repair. Patient will continue to benefit from skilled PT services to address functional mobility deficits and address ROM deficits to attain remaining goals. [x]  See Plan of Care  []  See progress note/recertification  []  See Discharge Summary         Progress towards goals / Updated goals:    Short Term Goals: To be accomplished in 6  treatments. To perform HEP for increasing PROM of right shoulder to within full range to maintain mobility  Patient will be able to tree and doff clothing overhead with no difficulty. .     Long Term Goals: To be accomplished in 12  Treatments. Patient will be able to place light object overhead on shelf with little difficulty. Patient will be able to place had on head with right hand with no difficulty.   Patient will be able to perform light job task     PLAN  [x]  Upgrade activities as tolerated     [x]  Continue plan of care  []  Update interventions per flow sheet       []  Discharge due to:_  []  Other:_      Elia Neff, PT,  4/5/2022

## 2022-04-07 ENCOUNTER — HOSPITAL ENCOUNTER (OUTPATIENT)
Dept: PHYSICAL THERAPY | Age: 57
Discharge: HOME | End: 2022-04-07
Payer: COMMERCIAL

## 2022-04-07 PROCEDURE — 97016 VASOPNEUMATIC DEVICE THERAPY: CPT

## 2022-04-07 PROCEDURE — 97110 THERAPEUTIC EXERCISES: CPT

## 2022-04-07 PROCEDURE — 97140 MANUAL THERAPY 1/> REGIONS: CPT

## 2022-04-07 NOTE — PROGRESS NOTES
PT DAILY TREATMENT NOTE 2-15    Patient Name[facility]mer El  [facility]  : 1965  [x]  Patient  Verified  Payor: Anne 22 / Plan: [address] / Product Type: Managed Care Medicaid /    In time:3:30  Out time:4:35  Total Treatment Time (min): 65  Visit #:  3    Treatment Area: Status post shoulder surgery [Z98.890]    SUBJECTIVE  Pain Level (0-10 scale): 0/10  Any medication changes, allergies to medications, adverse drug reactions, diagnosis change, or new procedure performed?: [x] No    [] Yes (see summary sheet for update)  Subjective functional status/changes:   [x] No changes reported  Denied pain at time of arrival and at end of session. OBJECTIVE      Modality rationale: decrease edema, decrease inflammation and decrease pain to improve the patients ability to decrease pain and increase tolerance to all functional mobility. Min Type Additional Details       [] Estim: []Att   []Unatt    []TENS instruct                  []IFC  []Premod   []NMES                    []Other:  []w/US      []w/ heat  []w/ ice  Position:  Location:       []  Traction: [] Cervical       []Lumbar                       [] Prone          []Supine                       []Intermittent   []Continuous Lbs:  [] before manual  [] after manual  [] w/ heat  [] Simultaneously performed with w/ Estim    []  Ultrasound: []Continuous   [] Pulsed                       at: []1MHz   []3MHz Location:  W/cm2:    [] Paraffin         Location:   []w/heat    []  Ice     []  Heat  []  Ice massage Position:  Location:    []  Laser  []  Other: Position:  Location:     10 [x]  Vasopneumatic Device Rt.  [place] Pressure:       [x] lo [] med [] hi   [x] w/ ice      Temperature: 34F[] Simultaneously performed with w/ Estim     [x] Skin assessment post-treatment:  [x]intact []redness- no adverse reaction    []redness - adverse reaction:       45 min Therapeutic Exercise:  [x] See flow sheet :   Rationale: increase ROM and increase strength to improve the patients ability to attain and maintain highest practicable functional mobility. 10 min Manual Therapy:  PROM TO RT. SHOULDER   Rationale: increase ROM and increase tissue extensibility  to improve the patients ability to attain and maintain highest practicable functional mobility. With   [] TE   [] TA   [] neuro   [] other: Patient Education: [x] Review HEP    [] Progressed/Changed HEP based on:   [] positioning   [] body mechanics   [] transfers   [] heat/ice application    [] other:      Other Objective/Functional Measures: N/A     Pain Level (0-10 scale) post treatment: 0/10    ASSESSMENT/Changes in Function:   Patient tolerated treatment well and demonstrated no pain at time of arrival or at end of session. Patient will continue to benefit from skilled PT services to modify and progress therapeutic interventions, address functional mobility deficits, address ROM deficits, address strength deficits and analyze and address soft tissue restrictions to attain remaining goals. [x]  See Plan of Care  []  See progress note/recertification  []  See Discharge Summary         Progress towards goals / Updated goals:       Short Term Goals: To be accomplished in 6  treatments. To perform HEP for increasing PROM of right shoulder to within full range to maintain mobility  Patient will be able to tree and doff clothing overhead with no difficulty. .     Long Term Goals: To be accomplished in 12  Treatments. Patient will be able to place light object overhead on shelf with little difficulty. Patient will be able to place had on head with right hand with no difficulty.   Patient will be able to perform light job task     PLAN  [x]  Upgrade activities as tolerated     [x]  Continue plan of care  []  Update interventions per flow sheet       []  Discharge due to:_  []  Other:_      Geri Sierra PTA, L.P.T.A. 4/7/2022

## 2022-04-08 ENCOUNTER — OFFICE VISIT (OUTPATIENT)
Dept: FAMILY MEDICINE CLINIC | Age: 57
End: 2022-04-08
Payer: COMMERCIAL

## 2022-04-08 VITALS
BODY MASS INDEX: 31.37 KG/M2 | TEMPERATURE: 98.1 F | RESPIRATION RATE: 18 BRPM | SYSTOLIC BLOOD PRESSURE: 119 MMHG | DIASTOLIC BLOOD PRESSURE: 73 MMHG | WEIGHT: 207 LBS | OXYGEN SATURATION: 97 % | HEART RATE: 60 BPM | HEIGHT: 68 IN

## 2022-04-08 DIAGNOSIS — Z98.890 STATUS POST SHOULDER SURGERY: ICD-10-CM

## 2022-04-08 DIAGNOSIS — R07.89 ATYPICAL CHEST PAIN: ICD-10-CM

## 2022-04-08 DIAGNOSIS — E78.00 HYPERCHOLESTEROLEMIA: ICD-10-CM

## 2022-04-08 DIAGNOSIS — Z00.00 ENCOUNTER FOR ANNUAL PHYSICAL EXAM: Primary | ICD-10-CM

## 2022-04-08 DIAGNOSIS — R49.0 HOARSE VOICE QUALITY: ICD-10-CM

## 2022-04-08 DIAGNOSIS — I10 HYPERTENSION, UNSPECIFIED TYPE: ICD-10-CM

## 2022-04-08 DIAGNOSIS — E29.1 MALE HYPOGONADISM: ICD-10-CM

## 2022-04-08 DIAGNOSIS — E11.40 TYPE 2 DIABETES MELLITUS WITH DIABETIC NEUROPATHY, WITHOUT LONG-TERM CURRENT USE OF INSULIN (HCC): ICD-10-CM

## 2022-04-08 PROCEDURE — 99396 PREV VISIT EST AGE 40-64: CPT | Performed by: NURSE PRACTITIONER

## 2022-04-08 RX ORDER — TESTOSTERONE CYPIONATE 200 MG/ML
INJECTION INTRAMUSCULAR
Qty: 10 ML | Refills: 0 | Status: SHIPPED | OUTPATIENT
Start: 2022-04-08 | End: 2022-06-14 | Stop reason: SDUPTHER

## 2022-04-08 RX ORDER — NITROGLYCERIN 0.3 MG/1
0.3 TABLET SUBLINGUAL
Qty: 25 TABLET | Refills: 0 | Status: SHIPPED | OUTPATIENT
Start: 2022-04-08

## 2022-04-08 NOTE — PATIENT INSTRUCTIONS
Angina: Care Instructions  Your Care Instructions     You have a problem called angina. Angina happens when there is not enough blood flow to your heart muscle. Angina is a sign of coronary artery disease (CAD). CAD occurs when blood vessels that supply the heart become narrowed. Having CAD increases your risk of a heart attack. Chest pain or pressure is the most common symptom of angina. But some people have other symptoms, like:  · Pain, pressure, or a strange feeling in the back, neck, jaw, or upper belly, or in one or both shoulders or arms. · Shortness of breath. · Nausea or vomiting. · Lightheadedness or sudden weakness. · Fast or irregular heartbeat. Women are somewhat more likely than men to have angina symptoms like shortness of breath, nausea, and back or jaw pain. Angina can be dangerous. That's why it is important to pay attention to your symptoms. Know what is typical for you, learn how to control your symptoms, and understand when you need to get treatment. A change in your usual pattern of symptoms is an emergency. It may mean that you are having a heart attack. The doctor has checked you carefully, but problems can develop later. If you notice any problems or new symptoms, get medical treatment right away. Follow-up care is a key part of your treatment and safety. Be sure to make and go to all appointments, and call your doctor if you are having problems. It's also a good idea to know your test results and keep a list of the medicines you take. How can you care for yourself at home? Medicines    · If your doctor has given you nitroglycerin for angina symptoms, keep it with you at all times. If you have symptoms, sit down and rest, and take the first dose of nitroglycerin as directed. If your symptoms get worse or are not getting better within 5 minutes, call 911 right away. Stay on the phone.  The emergency  will give you further instructions.     · If your doctor advises it, take 1 low-dose aspirin a day to prevent heart attack.     · Be safe with medicines. Take your medicines exactly as prescribed. Call your doctor if you think you are having a problem with your medicine. You will get more details on the specific medicines your doctor prescribes. Lifestyle changes    · Do not smoke. If you need help quitting, talk to your doctor about stop-smoking programs and medicines. These can increase your chances of quitting for good.     · Eat a heart-healthy diet that is low in saturated fat and salt, and is high in fiber. Talk to your doctor or a dietitian about healthy eating.     · Stay at a healthy weight. Or lose weight if you need to. Activity    · Talk to your doctor about a level of activity that is safe for you.     · If an activity causes angina symptoms, stop and rest.   When should you call for help? Call 911 anytime you think you may need emergency care. For example, call if:    · You passed out (lost consciousness).     · You have symptoms of a heart attack. These may include:  ? Chest pain or pressure, or a strange feeling in the chest.  ? Sweating. ? Shortness of breath. ? Nausea or vomiting. ? Pain, pressure, or a strange feeling in the back, neck, jaw, or upper belly or in one or both shoulders or arms. ? Lightheadedness or sudden weakness. ? A fast or irregular heartbeat. After you call 911, the  may tell you to chew 1 adult-strength or 2 to 4 low-dose aspirin. Wait for an ambulance. Do not try to drive yourself.     · You have angina symptoms that do not go away with rest or are not getting better within 5 minutes after you take a dose of nitroglycerin. Call your doctor now if:    · Your angina symptoms seem worse but still follow your typical pattern. You can predict when symptoms will happen, but they may come on sooner, feel worse, or last longer.     · You feel dizzy or lightheaded, or you feel like you may faint.    Watch closely for changes in your health, and be sure to contact your doctor if you have any problems. Where can you learn more? Go to http://www.gray.com/  Enter H129 in the search box to learn more about \"Angina: Care Instructions. \"  Current as of: January 10, 2022               Content Version: 13.2  © 0367-0816 [employer]wise, [employer]. Care instructions adapted under license by [employer] (which disclaims liability or warranty for this information). If you have questions about a medical condition or this instruction, always ask your healthcare professional. [address] any warranty or liability for your use of this information.

## 2022-04-08 NOTE — PROGRESS NOTES
Chief Complaint   Patient presents with    Physical    Labs     Patient in office today for cpe and fasting labs. Pt needs refill of testosterone-have been off of medication for 1 month. 1. Have you been to the ER, urgent care clinic since your last visit? Hospitalized since your last visit? No    2. Have you seen or consulted any other health care providers outside of the [address] [place] [address] since your last visit? Include any pap smears or colon screening.  No

## 2022-04-08 NOTE — PROGRESS NOTES
Chief Complaint   Patient presents with    Physical    Labs     Patient in office today for cpe and fasting labs. Pt needs refill of testosterone-have been off of medication for 1 month. Tolerating otherwise. 1 mL every 2 weeks. Health Maintenance Due   Topic Date Due    COVID-19 Vaccine (1) Never done    Pneumococcal 0-64 years (1 of 2 - PPSV23) Never done    Foot Exam Q1  Never done    MICROALBUMIN Q1  Never done    DTaP/Tdap/Td series (1 - Tdap) Never done    Shingrix Vaccine Age 50> (1 of 2) Never done    A1C test (Diabetic or Prediabetic)  01/22/2022    Lipid Screen  01/22/2022     Feeling good. Right arm is in sling. Had 3 rotator cuff tears repaired, 2 bone spurs repaired, bicep re-attachment to shoulder. ROM improving. Dr. Cordova. Has been to see PT 3 times now. Denies any cp, sob, and dyspnea. Pt did notice a chest pain with intercourse that resolved with rest. Has happened a few times now. Lasted for a few minutes. Has only occurred with intercourse. Recalls having a stress test 10 years ago. Denies any ha or dizziness. Denies any n/v/c/d. Denies any urinary sx. Receiving suboxone. Has had hoarse voice since having his tonsils and adenoids removed at age 5. Hemorrhaged and had a complication. Must have damaged the larynx. Has had a polyp removed by ENT about 5 years ago. Denies any other concerns at this time. Chief Complaint   Patient presents with   Navin Gee     he is a 64y.o. year old male who presents for evalution. Reviewed PmHx, RxHx, FmHx, SocHx, AllgHx and updated and dated in the chart.     Review of Systems - negative except as listed above in the HPI    Objective:     Vitals:    04/08/22 0817   BP: 119/73   Pulse: 60   Resp: 18   Temp: 98.1 °F (36.7 °C)   TempSrc: Oral   SpO2: 97%   Weight: 207 lb (93.9 kg)   Height: 5' 8\" (1.727 m)     Physical Examination: General appearance - alert, well appearing, and in no distress  Mental status - alert, oriented to person, place, and time, normal mood, behavior, speech, dress, motor activity, and thought processes  Eyes - pupils equal and reactive, extraocular eye movements intact  Ears - bilateral TM's and external ear canals normal  Nose - normal and patent, no erythema, discharge or polyps and normal nontender sinuses  Mouth - mucous membranes moist, pharynx normal without lesions  Neck - supple, no significant adenopathy, carotids upstroke normal bilaterally, no bruits, thyroid exam: thyroid is normal in size without nodules or tenderness  Chest - clear to auscultation, no wheezes, rales or rhonchi, symmetric air entry  Heart - normal rate and regular rhythm, systolic murmur 2/6 at 2nd left intercostal space  Extremities - peripheral pulses normal, no ankle edema, no clubbing or cyanosis  Skin - normal coloration and turgor    Assessment/ Plan:   Diagnoses and all orders for this visit:    1. Encounter for annual physical exam    2. Type 2 diabetes mellitus with diabetic neuropathy, without long-term current use of insulin (HCC)  -     HEMOGLOBIN A1C WITH EAG; Future  -     MICROALBUMIN, UR, RAND W/ MICROALB/CREAT RATIO; Future  Will notify results and deviate plan based on findings. Continue compliance with diet and medication regimen. 3. Hypertension, unspecified type  -     METABOLIC PANEL, COMPREHENSIVE; Future  -     CBC WITH AUTOMATED DIFF; Future  -     TSH 3RD GENERATION; Future  BP at goal on med regimen. Continue taking daily as prescribed. 4. Hypercholesterolemia  -     LIPID PANEL; Future  Continue crestor daily. LDL goal 70 or less. 5. Male hypogonadism  -     TESTOSTERONE, FREE & TOTAL; Future  -     PSA W/ REFLX FREE PSA; Future  Will notify results and deviate plan based on findings. Resume injections every 2 weeks. 6. Atypical chest pain  -     REFERRAL TO CARDIOLOGY  Referral to Dr. Alva Cartwright to [facility] care.  Would benefit from stress test for further eval. Reinforced acute/worsening s/sx that warrant more immediate medical attention. PRN NTG for acute sx. 7. Hoarse voice quality  Chronic. Consider referral back to ENT if any changes or concerning findings on exam.   8. Status post shoulder surgery  Continue PT and continue to follow up with ortho as advised. I have discussed the diagnosis with the patient and the intended plan as seen in the above orders. The patient has received an after-visit summary and questions were answered concerning future plans. Medication Side Effects and Warnings were discussed with patient: yes  Patient Labs were reviewed and or requested: yes  Patient Past Records were reviewed and or requested  yes  Patient / Caregiver Understanding of treatment plan was verbalized during office visit YES    TU Nguyen    There are no Patient Instructions on file for this visit.

## 2022-04-09 LAB
ALBUMIN/CREAT UR: 22 MG/G CREAT (ref 0–29)
CREAT UR-MCNC: 156.7 MG/DL
IMP & REVIEW OF LAB RESULTS: NORMAL
MICROALBUMIN UR-MCNC: 35.2 UG/ML

## 2022-04-10 LAB
ALBUMIN SERPL-MCNC: 4.4 G/DL (ref 3.8–4.9)
ALBUMIN/GLOB SERPL: 1.6 {RATIO} (ref 1.2–2.2)
ALP SERPL-CCNC: 90 IU/L (ref 44–121)
ALT SERPL-CCNC: 24 IU/L (ref 0–44)
AST SERPL-CCNC: 25 IU/L (ref 0–40)
BASOPHILS # BLD AUTO: 0.1 X10E3/UL (ref 0–0.2)
BASOPHILS NFR BLD AUTO: 1 %
BILIRUB SERPL-MCNC: 0.9 MG/DL (ref 0–1.2)
BUN SERPL-MCNC: 8 MG/DL (ref 6–24)
BUN/CREAT SERPL: 12 (ref 9–20)
CALCIUM SERPL-MCNC: 9.2 MG/DL (ref 8.7–10.2)
CHLORIDE SERPL-SCNC: 98 MMOL/L (ref 96–106)
CHOLEST SERPL-MCNC: 92 MG/DL (ref 100–199)
CO2 SERPL-SCNC: 23 MMOL/L (ref 20–29)
CREAT SERPL-MCNC: 0.65 MG/DL (ref 0.76–1.27)
EGFR: 111 ML/MIN/1.73
EOSINOPHIL # BLD AUTO: 0.5 X10E3/UL (ref 0–0.4)
EOSINOPHIL NFR BLD AUTO: 5 %
ERYTHROCYTE [DISTWIDTH] IN BLOOD BY AUTOMATED COUNT: 12.7 % (ref 11.6–15.4)
EST. AVERAGE GLUCOSE BLD GHB EST-MCNC: 169 MG/DL
GLOBULIN SER CALC-MCNC: 2.8 G/DL (ref 1.5–4.5)
GLUCOSE SERPL-MCNC: 153 MG/DL (ref 65–99)
HBA1C MFR BLD: 7.5 % (ref 4.8–5.6)
HCT VFR BLD AUTO: 44.8 % (ref 37.5–51)
HDLC SERPL-MCNC: 27 MG/DL
HGB BLD-MCNC: 15.1 G/DL (ref 13–17.7)
IMM GRANULOCYTES # BLD AUTO: 0.1 X10E3/UL (ref 0–0.1)
IMM GRANULOCYTES NFR BLD AUTO: 1 %
IMP & REVIEW OF LAB RESULTS: NORMAL
LDLC SERPL CALC-MCNC: 38 MG/DL (ref 0–99)
LYMPHOCYTES # BLD AUTO: 2.3 X10E3/UL (ref 0.7–3.1)
LYMPHOCYTES NFR BLD AUTO: 21 %
MCH RBC QN AUTO: 31.3 PG (ref 26.6–33)
MCHC RBC AUTO-ENTMCNC: 33.7 G/DL (ref 31.5–35.7)
MCV RBC AUTO: 93 FL (ref 79–97)
MONOCYTES # BLD AUTO: 1.1 X10E3/UL (ref 0.1–0.9)
MONOCYTES NFR BLD AUTO: 11 %
NEUTROPHILS # BLD AUTO: 6.7 X10E3/UL (ref 1.4–7)
NEUTROPHILS NFR BLD AUTO: 61 %
PLATELET # BLD AUTO: 178 X10E3/UL (ref 150–450)
POTASSIUM SERPL-SCNC: 4.8 MMOL/L (ref 3.5–5.2)
PROT SERPL-MCNC: 7.2 G/DL (ref 6–8.5)
PSA SERPL-MCNC: 0.4 NG/ML (ref 0–4)
RBC # BLD AUTO: 4.83 X10E6/UL (ref 4.14–5.8)
REFLEX CRITERIA: NORMAL
SODIUM SERPL-SCNC: 140 MMOL/L (ref 134–144)
TESTOST FREE SERPL-MCNC: 2 PG/ML (ref 7.2–24)
TESTOST SERPL-MCNC: 113 NG/DL (ref 264–916)
TRIGL SERPL-MCNC: 158 MG/DL (ref 0–149)
TSH SERPL DL<=0.005 MIU/L-ACNC: 1.5 UIU/ML (ref 0.45–4.5)
VLDLC SERPL CALC-MCNC: 27 MG/DL (ref 5–40)
WBC # BLD AUTO: 10.8 X10E3/UL (ref 3.4–10.8)

## 2022-04-11 PROBLEM — E29.1 MALE HYPOGONADISM: Status: ACTIVE | Noted: 2022-04-08

## 2022-04-11 PROBLEM — R49.0 HOARSE VOICE QUALITY: Status: ACTIVE | Noted: 2022-04-08

## 2022-04-11 NOTE — PROGRESS NOTES
The following message was sent to pt via [facility] portal in reference to lab results:  Good morning Mr. Meagan Lunsford are the results of your most recent lab work. I have the following recommendations:    1. Your CBC which looks at your white blood cells, red blood cells, and hemoglobin came back looking normal. No sign of infection or anemia. 2. Your metabolic panel which looks at your blood glucose, liver function, and kidney function looks good minus your glucose being elevated from your type 2 diabetes. 3. Your cholesterol came back looking great. Your cholesterol is almost too low! Lets keep you on the 5 mg of Crestor daily for now and continue to monitor this. I still recommend you schedule an appointment with the cardiologist after our conversation on Friday. 4. Unfortunately your diabetes has worsened. Your a1c went from a 6.2 to a 7.5. We really need to get that number to less than 7. I recommend we add to your current med regimen. I can either start you on another once daily pill in the morning and keep you on your current dose of metformin OR I can change the metformin to a combo pill that has two medications in one. What would you prefer? 5. Your testosterone levels are low which we expected. Resume your administration of 1 mL Testosterone every 2 weeks and that should trend back up. Let me know if you'd like to see a urologist.     6. Your PSA which is a screen for enlarged prostate and prostate cancer also came back normal. We will continue to check this yearly. 7. Your TSH which screens for thyroid disease came back normal. This means you do not have hyper or hypothyroidism. 8. Your urine microalbumin test is normal. This is a test that we run on the urine to screen for kidney damage from chronic type 2 diabetes. We will continue to check this yearly. The best way to keep this from worsening is to have strict glucose control and an a1c less than 7.0.      Lets recheck these labs in 3 months. Let me know your thoughts on adjusting your diabetes medication.  Please do not hesitate to call me or schedule an appointment to be seen if you need anything else in the meantime :)    Megha Medina, ESTELITAC

## 2022-04-12 ENCOUNTER — HOSPITAL ENCOUNTER (OUTPATIENT)
Dept: PHYSICAL THERAPY | Age: 57
Discharge: HOME | End: 2022-04-12
Payer: COMMERCIAL

## 2022-04-12 PROCEDURE — 97016 VASOPNEUMATIC DEVICE THERAPY: CPT

## 2022-04-12 PROCEDURE — 97110 THERAPEUTIC EXERCISES: CPT

## 2022-04-12 NOTE — PROGRESS NOTES
PT DAILY TREATMENT NOTE 2-15    Patient Name: Krystin Monroe Sr.  Date:2022  : 1965  [x]  Patient  Verified  Payor: Anne  / Plan: [address] / Product Type: Managed Care Medicaid /    In time:1505  Out time:1610  Total Treatment Time (min): 65  Visit #:  4    Treatment Area: Status post shoulder surgery [Z98.890]    SUBJECTIVE  Pain Level (0-10 scale): 0/10  Any medication changes, allergies to medications, adverse drug reactions, diagnosis change, or new procedure performed?: [x] No    [] Yes (see summary sheet for update)  Subjective functional status/changes:   [] No changes reported  \"I have been moving my arm a bit, it mostly hurts at night.   I think my biceps has detached again\"    OBJECTIVE      Modality rationale: decrease edema, decrease inflammation and decrease pain to improve the patients ability to perform R shoulder AROM without pain   Min Type Additional Details       [] Estim: []Att   []Unatt    []TENS instruct                  []IFC  []Premod   []NMES                    []Other:  []w/US      []w/ heat  []w/ ice  Position:  Location:       []  Traction: [] Cervical       []Lumbar                       [] Prone          []Supine                       []Intermittent   []Continuous Lbs:  [] before manual  [] after manual  [] w/ heat  [] Simultaneously performed with w/ Estim    []  Ultrasound: []Continuous   [] Pulsed                       at: []1MHz   []3MHz Location:  W/cm2:    [] Paraffin         Location:   []w/heat    []  Ice     []  Heat  []  Ice massage Position:  Location:    []  Laser  []  Other: Position:  Location:     10 [x]  Vasopneumatic Device Pressure:       [] lo [x] med [] hi   [x] w/ ice      Temperature: 34  [] Simultaneously performed with w/ Estim     [x] Skin assessment post-treatment:  [x]intact []redness- no adverse reaction    []redness - adverse reaction:       50 min Therapeutic Exercise:  [x] See flow sheet :   Rationale: increase ROM and increase strength to improve the patients ability to perform IADL's without pain or mofifications             With   [x] TE   [] TA   [] neuro   [] other: Patient Education: [x] Review HEP    [] Progressed/Changed HEP based on:   [] positioning   [] body mechanics   [] transfers   [] heat/ice application    [] other:      Other Objective/Functional Measures: pt did appear to have a bulge with elbow flexion consistent with detatched long head of biceps. Pt initiated therband resisted scapular exercises today     Pain Level (0-10 scale) post treatment: 0/10    ASSESSMENT/Changes in Function:   Patient tolerated treatment well. He was re-assured his biceps tendon will likely scar down and re-attach itself naturally. Cues were provided to ensure proper technique with all new exercises today  Patient will continue to benefit from skilled PT services to modify and progress therapeutic interventions, address functional mobility deficits, address ROM deficits, address strength deficits, analyze and cue movement patterns and address imbalance/dizziness to attain remaining goals. [x]  See Plan of Care  []  See progress note/recertification  []  See Discharge Summary         Progress towards goals / Updated goals:  Short Term Goals: To be accomplished in 6  treatments. To perform HEP for increasing PROM of right shoulder to within full range to maintain mobility  Partially Met  Patient will be able to tree and doff clothing overhead with no difficulty. Partially Met     Long Term Goals: To be accomplished in 12  Treatments. Patient will be able to place light object overhead on shelf with little difficulty. Partially Met  Patient will be able to place had on head with right hand with no difficulty. Partially Met  Patient will be able to perform light job task   Partially Met    PLAN  [x]  Upgrade activities as tolerated     [x]  Continue plan of care  []  Update interventions per flow sheet       [] Discharge due to:_  []  Other:_      Jersey Watters, PT, DPT 4/12/2022

## 2022-04-14 ENCOUNTER — HOSPITAL ENCOUNTER (OUTPATIENT)
Dept: PHYSICAL THERAPY | Age: 57
Discharge: HOME | End: 2022-04-14
Payer: COMMERCIAL

## 2022-04-14 PROCEDURE — 97016 VASOPNEUMATIC DEVICE THERAPY: CPT

## 2022-04-14 PROCEDURE — 97110 THERAPEUTIC EXERCISES: CPT

## 2022-04-14 NOTE — PROGRESS NOTES
PT DAILY TREATMENT NOTE 2-15    Patient Name: Glen Silva.  Date:2022  : 1965  [x]  Patient  Verified  Payor: Anne Cuenca / Plan: [address] / Product Type: Managed Care Medicaid /    In time:9:40 AM  Out time: 10:35 AM  Total Treatment Time (min): 55  Visit #:  5    Treatment Area: Status post shoulder surgery [Z98.890]    SUBJECTIVE  Pain Level (0-10 scale): 0/10  Any medication changes, allergies to medications, adverse drug reactions, diagnosis change, or new procedure performed?: [x] No    [] Yes (see summary sheet for update)    Subjective functional status/changes:   [] No changes reported  The other night I just turned over and had an awful pain. I got up and look at my arm and  saw that my bicep tendon had ruptured. I not going to have surgery to fix it because I can  do alright without getting it fixed. Return to the MD on 2022. OBJECTIVE      Modality rationale: decrease edema and decrease pain to improve the patients ability to tree and doff clothing overhead with no difficulty.     Min Type Additional Details       [] Estim: []Att   []Unatt    []TENS instruct                  []IFC  []Premod   []NMES                    []Other:  []w/US      []w/ heat  []w/ ice  Position:  Location:       []  Traction: [] Cervical       []Lumbar                       [] Prone          []Supine                       []Intermittent   []Continuous Lbs:  [] before manual  [] after manual  [] w/ heat  [] Simultaneously performed with w/ Estim    []  Ultrasound: []Continuous   [] Pulsed                       at: []1MHz   []3MHz Location:  W/cm2:    [] Paraffin         Location:   []w/heat    []  Ice     []  Heat  []  Ice massage Position:  Location:    []  Laser  []  Other: Position:  Location:     10 [x]  Vasopneumatic Device Pressure:       [x] lo [] med [] hi   [x] w/ ice      Temperature: 34  [] Simultaneously performed with w/ Estim     [x] Skin assessment post-treatment:  [x]intact []redness- no adverse reaction    []redness - adverse reaction:       45 min Therapeutic Exercise:  [x] See flow sheet :   Rationale: increase ROM and increase strength to improve the patients ability to will be able to tree and   doff clothing overhead with no difficulty. With   [] TE   [] TA   [] neuro   [] other: Patient Education: [x] Review HEP    [] Progressed/Changed HEP based on:   [] positioning   [] body mechanics   [] transfers   [] heat/ice application    [] other:      Other Objective: AAROM and AROM for right RTC repair     Pain Level (0-10 scale) post treatment: 0/10    ASSESSMENT/Changes in Function:    Patient tolerated treatment. Patient continues to work with housing repairs. Patient has improved range of motion in each plane. He does have a rupture bicep tendon of the right UE with no complaint of increasing pain. Patient will return to the MD on April 19, 2022. Patient will continue to benefit from skilled PT services to address ROM deficits, address strength deficits, analyze and address soft tissue restrictions and analyze and cue movement patterns to attain remaining goals. [x]  See Plan of Care  []  See progress note/recertification  []  See Discharge Summary            Progress towards goals / Updated goals:  Short Term Goals: To be accomplished in 6  treatments. To perform HEP for increasing PROM of right shoulder to within full range to maintain mobility  Partially Met  Patient will be able to tree and doff clothing overhead with no difficulty. Partially Met     Long Term Goals: To be accomplished in 12  Treatments. Patient will be able to place light object overhead on shelf with little difficulty. Partially Met  Patient will be able to place had on head with right hand with no difficulty. Partially Met  Patient will be able to perform light job task   Partially Met    PLAN  [x]  Upgrade activities as tolerated     [x]  Continue plan of care  []  Update interventions per flow sheet       []  Discharge due to:_  []  Other:_      Luisa Singletary PT,  4/14/2022

## 2022-04-19 ENCOUNTER — APPOINTMENT (OUTPATIENT)
Dept: PHYSICAL THERAPY | Age: 57
End: 2022-04-19
Payer: COMMERCIAL

## 2022-04-21 ENCOUNTER — HOSPITAL ENCOUNTER (OUTPATIENT)
Dept: PHYSICAL THERAPY | Age: 57
Discharge: HOME | End: 2022-04-21
Payer: COMMERCIAL

## 2022-04-21 PROCEDURE — 97016 VASOPNEUMATIC DEVICE THERAPY: CPT

## 2022-04-21 PROCEDURE — 97110 THERAPEUTIC EXERCISES: CPT

## 2022-04-21 PROCEDURE — 97140 MANUAL THERAPY 1/> REGIONS: CPT

## 2022-04-21 NOTE — PROGRESS NOTES
PT DAILY TREATMENT NOTE 2-15    Patient Name: Shani Robles.  Date:2022  : 1965  [x]  Patient  Verified  Payor: Anne Cuenca / Plan: [address] / Product Type: Managed Care Medicaid /    In time:3:29  Out time:4:31  Total Treatment Time (min): 58  Visit #:  6    Treatment Area: Status post shoulder surgery [Z98.890]    SUBJECTIVE  Pain Level (0-10 scale): 2/10  Any medication changes, allergies to medications, adverse drug reactions, diagnosis change, or new procedure performed?: [x] No    [] Yes (see summary sheet for update)  Subjective functional status/changes:   [x] No changes reported  Complained of 2/10 pain in anterior right shoulder at time of arrival. Reported he thought he had messed his shoulder up last week after he  had a coughing spell which caused him to black out and reports he woke up across the bed with shoulder pain that radiated into his neck, unsure of what happened to his shoulder during his black out spell. Reports that feeling has passed now. OBJECTIVE      Modality rationale: decrease edema, decrease inflammation and decrease pain to improve the patients ability to decrease pain and increase tolerance to all functional mobilty.     Min Type Additional Details       [] Estim: []Att   []Unatt    []TENS instruct                  []IFC  []Premod   []NMES                    []Other:  []w/US      []w/ heat  []w/ ice  Position:  Location:       []  Traction: [] Cervical       []Lumbar                       [] Prone          []Supine                       []Intermittent   []Continuous Lbs:  [] before manual  [] after manual  [] w/ heat  [] Simultaneously performed with w/ Estim    []  Ultrasound: []Continuous   [] Pulsed                       at: []1MHz   []3MHz Location:  W/cm2:    [] Paraffin         Location:   []w/heat    []  Ice     []  Heat  []  Ice massage Position:  Location:    []  Laser  []  Other: Position:  Location:     10 [x] Vasopneumatic Device: [place]. [place] Pressure:       [x] lo [] med [] hi   [x] w/ ice      Temperature:   [] Simultaneously performed with w/ Estim     [x] Skin assessment post-treatment:  [x]intact []redness- no adverse reaction    []redness - adverse reaction:       42 min Therapeutic Exercise:  [x] See flow sheet :   Rationale: increase ROM and increase strength to improve the patients ability to attain and maintain highest practicable functional mobility. 10 min Manual Therapy:  PROM TO RT. SHOULDER   Rationale: increase ROM and increase tissue extensibility  to improve the patients ability to attain and maintain highest practicable functional mobility. With   [] TE   [] TA   [] neuro   [] other: Patient Education: [x] Review HEP    [] Progressed/Changed HEP based on:   [] positioning   [] body mechanics   [] transfers   [] heat/ice application    [] other:      Other Objective/Functional Measures: N/A     Pain Level (0-10 scale) post treatment: 0/10    ASSESSMENT/Changes in Function:   Patient tolerated treatment well without complaint. Demonstrated benefit from skilled P.T. interventions as evidenced by report of decreased pain from 2/10 at time of arrival to 0/10 at close of session. Patient will continue to benefit from skilled PT services to modify and progress therapeutic interventions, address functional mobility deficits, address ROM deficits, address strength deficits and analyze and address soft tissue restrictions to attain remaining goals. [x]  See Plan of Care  []  See progress note/recertification  []  See Discharge Summary         Progress towards goals / Updated goals:  Short Term Goals: To be accomplished in 6  treatments.   To perform HEP for increasing PROM of right shoulder to within full range to maintain mobility  Partially Met  Patient will be able to tree and doff clothing overhead with no difficulty. Partially Met     Long Term Goals: To be accomplished in 12  Treatments. Patient will be able to place light object overhead on shelf with little difficulty.  Partially Met  Patient will be able to place had on head with right hand with no difficulty. Partially Met  Patient will be able to perform light job task   Partially Met       PLAN  [x]  Upgrade activities as tolerated     [x]  Continue plan of care  []  Update interventions per flow sheet       []  Discharge due to:_  []  Other:_      Aldo Reza PTA, L.P.T.A. 4/21/2022

## 2022-04-22 NOTE — PROGRESS NOTES
Comfort Walls Sr. (: 1965) is a 64 y.o. male, patient, here for evaluation of the following chief complaint(s):  Surgical Follow-up and Shoulder Pain (right)       HPI:    He is now approximately 9 weeks status post right shoulder arthroscopic exam with rotator cuff repair, acromioplasty, distal clavicle resection, extensive debridement, and open biceps tenodesis.  His surgery was performed on 2022. He was last seen on 3/29/2022. The patient states that his pain level has improved since his last visit and surgical procedure. He rates the severity of his postoperative right shoulder pain as a 2 out of 10. He describes his pain is aching and intermittent. He reports taking no medication for his discomfort. The patient has been attending formal physical therapy postoperatively. He has also been working on these exercises with an at-home exercise program.    Allergies   Allergen Reactions    Aspirin Other (comments)     GI Upset    Pcn [Penicillins] Unknown (comments)     Family reported. Current Outpatient Medications   Medication Sig    rosuvastatin (CRESTOR) 5 mg tablet Take 1 Tablet by mouth daily.  testosterone cypionate (DEPOTESTOTERONE CYPIONATE) 200 mg/mL injection INJECT 1 ML IN THE MUSCLE EVERY 14 DAYS( MAXIMUM DAILY AMOUNT IS 1 ML)    nitroglycerin (NITROSTAT) 0.3 mg SL tablet 1 Tablet by SubLINGual route every five (5) minutes as needed for Chest Pain. Up to 3 doses.  metFORMIN (GLUCOPHAGE) 500 mg tablet TAKE 2 TABLETS BY MOUTH TWICE DAILY WITH MEALS    metoprolol tartrate (LOPRESSOR) 100 mg IR tablet TAKE ONE TABLET BY MOUTH EVERY DAY    gabapentin (NEURONTIN) 800 mg tablet TAKE ONE TABLET BY MOUTH THREE TIMES DAILY (max OF 2,400mg daily)    omeprazole (PRILOSEC) 40 mg capsule Take 1 Capsule by mouth daily.  hydroCHLOROthiazide (MICROZIDE) 12.5 mg capsule Take 1 Capsule by mouth daily.     sucralfate (CARAFATE) 1 gram tablet TAKE 1 TABLET BY MOUTH TWICE DAILY AS NEEDED(GERD). CRUSH INTO 3 TABLESPOONSFUL OF WATER TO MAKE LOOSE WATERY MIXTURE.  Stool Softener 100 mg capsule Take 100 mg by mouth daily.  albuterol (PROVENTIL HFA, VENTOLIN HFA, PROAIR HFA) 90 mcg/actuation inhaler Take 2 Puffs by inhalation every four (4) hours as needed for Wheezing.  syringe with needle (Syringe 3cc/[id number]/2\") 3 mL 21 gauge x 1 1/2\" syrg For the administration of 1 mL testosterone every 2 weeks. Diagnosis code E29.1.  buprenorphine-naloxone (SUBOXONE) 8-2 mg film sublingaul film dissolve 1 film under the tongue TWICE DAILY     No current facility-administered medications for this visit.        Past Medical History:   Diagnosis Date    Acid reflux     Diabetes ([facility] [address]     Diabetic neuropathy ([facility] [address]     Hypercholesterolemia     Hypertension     Restless leg syndrome         Past Surgical History:   Procedure Laterality Date    HX ENDOSCOPY  02/01/2021    HX ROTATOR CUFF REPAIR  02/21/2022       Family History   Problem Relation Age of Onset    Diabetes Mother     Heart Disease Mother     Heart Disease Father         Social History     Socioeconomic History    Marital status:      Spouse name: Not on file    Number of children: Not on file    Years of education: Not on file    Highest education level: Not on file   Occupational History    Not on file   Tobacco Use    Smoking status: Current Every Day Smoker     Packs/day: 1.00     Types: Cigarettes    Smokeless tobacco: Never Used   Vaping Use    Vaping Use: Never used   Substance and Sexual Activity    Alcohol use: Not Currently    Drug use: Not Currently    Sexual activity: Yes   Other Topics Concern    Not on file   Social History Narrative    Not on file     Social Determinants of Health     Financial Resource Strain:     Difficulty of Paying Living Expenses: Not on file   Food Insecurity:     Worried About Running Out of Food in the Last Year: Not on file    Adalid of Food in the Last Year: Not on file   Transportation Needs:     Lack of Transportation (Medical): Not on file    Lack of Transportation (Non-Medical): Not on file   Physical Activity:     Days of Exercise per Week: Not on file    Minutes of Exercise per Session: Not on file   Stress:     Feeling of Stress : Not on file   Social Connections:     Frequency of Communication with Friends and Family: Not on file    Frequency of Social Gatherings with Friends and Family: Not on file    Attends Denominational Services: Not on file    Active Member of [address] [place] [address] or Organizations: Not on file    Attends Club or Organization Meetings: Not on file    Marital Status: Not on file   Intimate Partner Violence:     Fear of Current or Ex-Partner: Not on file    Emotionally Abused: Not on file    Physically Abused: Not on file    Sexually Abused: Not on file   Housing Stability:     Unable to Pay for Housing in the Last Year: Not on file    Number of [place] in the Last Year: Not on file    Unstable Housing in the Last Year: Not on file       Review of Systems   All other systems reviewed and are negative. Vitals: There were no vitals taken for this visit. There is no height or weight on file to calculate BMI. Ortho Exam     The patient is well-developed and well-nourished. The patient presents today in alert and oriented x3 with a normal mood and affect. The patient stands with a normal weightbearing line and walks with a normal gait.     Right shoulder wounds are clean and dry neurovascular intact. There is no ecchymosis or erythema. His incisions are well-healed with no sign of irritation or infection and look normal.  He does have full elbow and wrist range of motion. He has essentially regained full passive range of motion. His active range of motion continues to improve. He has approximately 150 degrees of active forward flexion and 110 degrees of active abduction. His strength has improved nicely since his last visit.   There is still weakness noted compared to normal which is to be expected. His sensations are intact and his pulses are 2+. His skin is well-healed. ASSESSMENT/PLAN:      1. Pain of right shoulder region  2. Status post shoulder surgery  -     REFERRAL TO PHYSICAL THERAPY       Below is the assessment and plan developed based on review of pertinent history, physical exam, labs, studies, and medications. **The patient was referred to formal physical therapy. **     We discussed the patient's right shoulder arthroscopic exam with rotator cuff repair, acromioplasty, distal clavicle resection, extensive debridement, and open biceps tenodesis.  His surgery was performed on 2/21/2022. He is now approximately 9 weeks status post surgical intervention. The patient continues to progress nicely in his recovery. His pain is intermittent and well controlled. His range of motion and strength both continue to improve. The patient will continue the postoperative protocol by working on range of motion, strengthening, and stretching exercises at both formal physical therapy and with an at-home exercise program as pain tolerates. He may continue to increase activities as tolerated and as discussed today in the office. He is still to avoid any external rotation activities and overhead activities. The patient will continue to ice when possible, modify his activity level based on his postoperative right shoulder and arm pain, and use anti-inflammatory medication when necessary. I will see him back in 3-4 weeks for reevaluation and further discussion of the postoperative protocol. Return in about 4 weeks (around 5/24/2022) for Reevaluation and further discussion of the postop protocol. An electronic signature was used to authenticate this note.   -- Kristine Espino MD

## 2022-04-26 ENCOUNTER — OFFICE VISIT (OUTPATIENT)
Dept: ORTHOPEDIC SURGERY | Age: 57
End: 2022-04-26
Payer: COMMERCIAL

## 2022-04-26 ENCOUNTER — HOSPITAL ENCOUNTER (OUTPATIENT)
Dept: PHYSICAL THERAPY | Age: 57
Discharge: HOME | End: 2022-04-26
Payer: COMMERCIAL

## 2022-04-26 DIAGNOSIS — M25.511 PAIN OF RIGHT SHOULDER REGION: Primary | ICD-10-CM

## 2022-04-26 DIAGNOSIS — Z98.890 STATUS POST SHOULDER SURGERY: ICD-10-CM

## 2022-04-26 DIAGNOSIS — E78.00 HYPERCHOLESTEROLEMIA: ICD-10-CM

## 2022-04-26 PROCEDURE — 97016 VASOPNEUMATIC DEVICE THERAPY: CPT

## 2022-04-26 PROCEDURE — 97110 THERAPEUTIC EXERCISES: CPT

## 2022-04-26 PROCEDURE — 99024 POSTOP FOLLOW-UP VISIT: CPT | Performed by: ORTHOPAEDIC SURGERY

## 2022-04-26 RX ORDER — ROSUVASTATIN CALCIUM 5 MG/1
5 TABLET, COATED ORAL DAILY
Qty: 90 TABLET | Refills: 3 | Status: SHIPPED | OUTPATIENT
Start: 2022-04-26

## 2022-04-26 NOTE — PROGRESS NOTES
PT DAILY TREATMENT NOTE 2-15    Patient Name: Nataly Hernandez Sr.  Date:2022  : 1965  [x]  Patient  Verified  Payor: Anne Cuenca / Plan: [address] / Product Type: Managed Care Medicaid /    In time: 4:10  Out time: 5:05  Total Treatment Time (min): 55  Visit #:  7    Treatment Area: Status post shoulder surgery [Z98.890]    SUBJECTIVE  Pain Level (0-10 scale): 3/10  Any medication changes, allergies to medications, adverse drug reactions, diagnosis change, or new procedure performed?: [x] No    [] Yes (see summary sheet for update)  Subjective functional status/changes:   [] No changes reported  \"I've been throwing hay all day. \" Pt 10 minutes late today.     OBJECTIVE    Modality rationale: decrease edema, decrease inflammation and decrease pain to improve the patients ability to be able to perform AAROM   Min Type Additional Details       [] Estim: []Att   []Unatt    []TENS instruct                  []IFC  []Premod   []NMES                    []Other:  []w/US      []w/ heat  []w/ ice  Position:  Location:       []  Traction: [] Cervical       []Lumbar                       [] Prone          []Supine                       []Intermittent   []Continuous Lbs:  [] before manual  [] after manual  [] w/ heat  [] Simultaneously performed with w/ Estim    []  Ultrasound: []Continuous   [] Pulsed                       at: []1MHz   []3MHz Location:  W/cm2:    [] Paraffin         Location:   []w/heat    []  Ice     []  Heat  []  Ice massage Position:  Location:    []  Laser  []  Other: Position:  Location:     10 [x]  Vasopneumatic Device Pressure:       [x] lo [] med [] hi   [x] w/ ice      Temperature: 34deg  [] Simultaneously performed with w/ Estim     [x] Skin assessment post-treatment:  [x]intact []redness- no adverse reaction    []redness - adverse reaction:       45 min Therapeutic Exercise:  [x] See flow sheet :   Rationale: increase ROM and increase strength to improve the patients ability to improve functional mobility         With   [x] TE   [] TA   [] neuro   [] other: Patient Education: [x] Review HEP    [] Progressed/Changed HEP based on:   [] positioning   [] body mechanics   [] transfers   [] heat/ice application    [] other:      Pain Level (0-10 scale) post treatment: 0/10    ASSESSMENT/Changes in Function: Patient tolerated treatment fairly well. Pt is 9 weeks s/p this week. Continued with AAROM exercises. Added isometrics. Per dr note today, no AROM ER or overhead activities. Patient will continue to benefit from skilled PT services to address functional mobility deficits, address ROM deficits and address strength deficits to attain remaining goals. [x]  See Plan of Care  []  See progress note/recertification  []  See Discharge Summary         Progress towards goals / Updated goals:  Short Term Goals: To be accomplished in 6  treatments. To perform HEP for increasing PROM of right shoulder to within full range to maintain mobility  Partially Met  Patient will be able to tree and doff clothing overhead with no difficulty. Partially Met     Long Term Goals: To be accomplished in 12  Treatments. Patient will be able to place light object overhead on shelf with little difficulty.  Partially Met  Patient will be able to place had on head with right hand with no difficulty. Partially Met  Patient will be able to perform light job task  [employer] Met    PLAN  [x]  Upgrade activities as tolerated     [x]  Continue plan of care  []  Update interventions per flow sheet       []  Discharge due to:_  []  Other:_      Christian Alejandro PTA, SUSY 4/26/2022

## 2022-04-28 ENCOUNTER — HOSPITAL ENCOUNTER (OUTPATIENT)
Dept: PHYSICAL THERAPY | Age: 57
Discharge: HOME | End: 2022-04-28
Payer: COMMERCIAL

## 2022-04-28 PROCEDURE — 97110 THERAPEUTIC EXERCISES: CPT

## 2022-04-28 PROCEDURE — 97016 VASOPNEUMATIC DEVICE THERAPY: CPT

## 2022-04-28 NOTE — PROGRESS NOTES
PT DAILY TREATMENT NOTE 2-15    Patient Name: Herbert Key.  Date:2022  : 1965  [x]  Patient  Verified  Payor: Anne Cuenca / Plan: [address] / Product Type: Managed Care Medicaid /    In time: 4:18  Out time: 5:07  Total Treatment Time (min): 49  Visit #:  8    Treatment Area: Status post shoulder surgery [Z98.890]    SUBJECTIVE  Pain Level (0-10 scale): 3/10  Any medication changes, allergies to medications, adverse drug reactions, diagnosis change, or new procedure performed?: [x] No    [] Yes (see summary sheet for update)  Subjective functional status/changes:     Pt 14 minutes late today. \"I think I did too much yesterday on the lawnmower and spreading straw. I had to sleep with ice last night. 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     OBJECTIVE    Modality rationale: decrease edema, decrease inflammation and decrease pain to improve the patients ability to be able to perform AAROM   Min Type Additional Details       [] Estim: []Att   []Unatt    []TENS instruct                  []IFC  []Premod   []NMES                    []Other:  []w/US      []w/ heat  []w/ ice  Position:  Location:       []  Traction: [] Cervical       []Lumbar                       [] Prone          []Supine                       []Intermittent   []Continuous Lbs:  [] before manual  [] after manual  [] w/ heat  [] Simultaneously performed with w/ Estim    []  Ultrasound: []Continuous   [] Pulsed                       at: []1MHz   []3MHz Location:  W/cm2:    [] Paraffin         Location:   []w/heat    []  Ice     []  Heat  []  Ice massage Position:  Location:    []  Laser  []  Other: Position:  Location:     10 [x]  Vasopneumatic Device Pressure:       [x] lo [] med [] hi   [x] w/ ice      Temperature: 34deg  [] Simultaneously performed with w/ Estim     [x] Skin assessment post-treatment:  [x]intact []redness- no adverse reaction    []redness - adverse reaction:       39 min Therapeutic Exercise:  [x] See flow sheet :   Rationale: increase ROM and increase strength to improve the patients ability to improve functional mobility         With   [x] TE   [] TA   [] neuro   [] other: Patient Education: [x] Review HEP    [] Progressed/Changed HEP based on:   [] positioning   [] body mechanics   [] transfers   [] heat/ice application    [] other:      Other Objective/Functional Measures:     Shoulder:   Strength AROM                     PROM       Right Left Right Left Right     Flexion n/t 5/5 n/t wfl 142     Abduction n/t 5/5 n/t wfl 94     IR n/t 5/5 n/t wfl 70     ER n/t 5/5 n/t wfl 53     Pain Level (0-10 scale) post treatment: 1/10    ASSESSMENT/Changes in Function: Patient tolerated treatment fairly well. All goals and measurements updated today. Progress note completed by physical therapist. Pt reports he has not been doing his HEP because of everything else he does to work his shoulder. Reviewed and gave pt handout of HEP and copy placed in chart. Patient will continue to benefit from skilled PT services to address functional mobility deficits, address ROM deficits and address strength deficits to attain remaining goals. [x]  See Plan of Care  [x]  See progress note/recertification  []  See Discharge Summary         Progress towards goals / Updated goals:  Short Term Goals: To be accomplished in 6  treatments. To perform HEP for increasing PROM of right shoulder to within full range to maintain mobility - PARTIALLY MET  Patient will be able to tree and doff clothing overhead with no difficulty - PARTIALLY MET     Long Term Goals: To be accomplished in 12  Treatments.   Patient will be able to place light object overhead on shelf with little difficulty - MET (able to reach cigarettes on shelf no issue)  Patient will be able to place hand on head with right hand with no difficulty - MET  Patient will be able to perform light job task - MET    PLAN  [x]  Upgrade activities as tolerated     [x]  Continue plan of care  []  Update interventions per flow sheet       []  Discharge due to:_  []  Other:_      Mo Todd, MARLON, LPKRISTINE 4/28/2022

## 2022-04-29 NOTE — PROGRESS NOTES
[address]  [place], [place]  Ph: [phone]    Fax: [phone]    Progress Note    Name: Todd Hernandez .   : 1965   MD: Yuri Lezama MD       Treatment Diagnosis: Status post shoulder surgery [Z98.890]  Start of Care: 3/30/2022    Visits from Start of Care: 8   Missed Visits: 1    Summary of Care / Assessment / Recommendations:   Patient presented to physical therapy with right shoulder pain, weakness, postural instability and decreased range of motion limiting all weight bearing activities, consistent with p/o R RTC repair, DCE, Acr, open biceps tendon on 2022. Pt has received treatment including range of motion, flexibility, strengthening, modalities for pain control, glenohumeral and scapulothoracic stabilization, and functional activities per MD protocol. Pt has demonstrated overall progress in rom, strength, pain level, and functional mobility. Pt continues to present with functional deficits including AROM and strength in all ranges, as well as prom abduction limitations. Per most recent MD note 2022, pt is \"to avoid any ER activities and overhead activities. \"  DPT has called to clarify if pt is to continue with full P/AAROM exercises that he has been performing in physical therapy, including overhead, or if MD would like to hold limit these motions at this time. DPT is awaiting return call from MD office. Pt is almost 10 weeks post op. 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 Pt would benefit from further skilled physical therapy interventions to continue to progress range of motion, strength, and balance, allowing for improved function.         Progress Toward Goals:  Short Term Goals: To be accomplished in 6  treatments. To perform HEP for increasing PROM of right shoulder to within full range to maintain mobility - PARTIALLY MET  Patient will be able to tree and doff clothing overhead with no difficulty - PARTIALLY MET     Long Term Goals: To be accomplished in 12  Treatments. Patient will be able to place light object overhead on shelf with little difficulty - MET (able to reach cigarettes on shelf no issue)  Patient will be able to place hand on head with right hand with no difficulty - MET  Patient will be able to perform light job task - MET  Patient will be able to ride his motorcycle for 1-2 hours without pain greater than or equal to 3/10 to increase functional mobility. (Added 4/29/2022)   Patient will be able to lay on ground/work under countertops to reach over his head without pain greater than or equal to 3/10 to increase functional mobility. (Added 4/29/2022)  Patient will be able to lift 5-10# boards at work without pain greater than or equal to 3/10 to increase functional mobility. (Added 4/29/2022)           Frequency/Duration:  2 treatments per week, for 5 weeks. Bhavya Bustamante, PT, DPT 4/29/2022         ________________________________________________________________________  NOTE TO PHYSICIAN:  Please complete the following and fax to:  [facility]:   Fax: [phone]  . Retain this original for your records. If you are unable to process this request in 24 hours, please contact our office.        ____ I have read the above report and request that my patient continue therapy with the following changes/special instructions:  ____ I have read the above report and request that my patient be discharged from therapy    Physician's Signature:_________________ Date:___________Time:__________

## 2022-05-03 ENCOUNTER — HOSPITAL ENCOUNTER (OUTPATIENT)
Dept: PHYSICAL THERAPY | Age: 57
Discharge: HOME | End: 2022-05-03
Payer: COMMERCIAL

## 2022-05-03 PROCEDURE — 97016 VASOPNEUMATIC DEVICE THERAPY: CPT

## 2022-05-03 PROCEDURE — 97110 THERAPEUTIC EXERCISES: CPT

## 2022-05-03 NOTE — PROGRESS NOTES
PT DAILY TREATMENT NOTE 2-15    Patient Name: Ghanshyam Wang  [id number]  : 1965  [x]  Patient  Verified  Payor: Anne 22 / Plan: [address] / Product Type: Managed Care Medicaid /    In time: 3:30  Out time: 4:31  Total Treatment Time (min): 61  Visit #:  9    Treatment Area: Status post shoulder surgery [Z98.890]    SUBJECTIVE  Pain Level (0-10 scale): 0/10  Any medication changes, allergies to medications, adverse drug reactions, diagnosis change, or new procedure performed?: [x] No    [] Yes (see summary sheet for update)  Subjective functional status/changes:     \"It's good today. \"    OBJECTIVE    Modality rationale: decrease edema, decrease inflammation and decrease pain to improve the patients ability to be able to perform AAROM   Min Type Additional Details       [] Estim: []Att   []Unatt    []TENS instruct                  []IFC  []Premod   []NMES                    []Other:  []w/US      []w/ heat  []w/ ice  Position:  Location:       []  Traction: [] Cervical       []Lumbar                       [] Prone          []Supine                       []Intermittent   []Continuous Lbs:  [] before manual  [] after manual  [] w/ heat  [] Simultaneously performed with w/ Estim    []  Ultrasound: []Continuous   [] Pulsed                       at: []1MHz   []3MHz Location:  W/cm2:    [] Paraffin         Location:   []w/heat    []  Ice     []  Heat  []  Ice massage Position:  Location:    []  Laser  []  Other: Position:  Location:     10 [x]  Vasopneumatic Device Pressure:       [x] lo [] med [] hi   [x] w/ ice      Temperature: 34deg  [] Simultaneously performed with w/ Estim     [x] Skin assessment post-treatment:  [x]intact []redness- no adverse reaction    []redness - adverse reaction:       51 min Therapeutic Exercise:  [x] See flow sheet :   Rationale: increase ROM and increase strength to improve the patients ability to improve functional mobility         With   [x] TE   [] TA   [] neuro   [] other: Patient Education: [x] Review HEP    [] Progressed/Changed HEP based on:   [] positioning   [] body mechanics   [] transfers   [] heat/ice application    [] other:      Pain Level (0-10 scale) post treatment: 0/10    ASSESSMENT/Changes in Function: Patient tolerated treatment fairly well. Pt did c/o some pain with wall slides in shoulder abduction, but was able to complete. Patient will continue to benefit from skilled PT services to address functional mobility deficits, address ROM deficits and address strength deficits to attain remaining goals. [x]  See Plan of Care  []  See progress note/recertification  []  See Discharge Summary         Progress towards goals / Updated goals:  Short Term Goals: To be accomplished in 6  treatments. To perform HEP for increasing PROM of right shoulder to within full range to maintain mobility - PARTIALLY MET  Patient will be able to tree and doff clothing overhead with no difficulty - PARTIALLY MET     Long Term Goals: To be accomplished in 12  Treatments.   Patient will be able to place light object overhead on shelf with little difficulty - MET (able to reach cigarettes on shelf no issue)  Patient will be able to place hand on head with right hand with no difficulty - MET  Patient will be able to perform light job task - MET  Patient will be able to ride his motorcycle for 1-2 hours without pain greater than or equal to 3/10 to increase functional mobility. (Added 4/29/2022)   Patient will be able to lay on ground/work under countertops to reach over his head without pain greater than or equal to 3/10 to increase functional mobility. (Added 4/29/2022)  Patient will be able to lift 5-10# boards at work without pain greater than or equal to 3/10 to increase functional mobility. (Added 4/29/2022)    PLAN  [x]  Upgrade activities as tolerated     [x]  Continue plan of care  []  Update interventions per flow sheet       []  Discharge due to:_  []  Other:_      Violet Coleman PTA, LPTA 5/3/2022

## 2022-05-05 ENCOUNTER — APPOINTMENT (OUTPATIENT)
Dept: PHYSICAL THERAPY | Age: 57
End: 2022-05-05
Payer: COMMERCIAL

## 2022-05-10 ENCOUNTER — HOSPITAL ENCOUNTER (OUTPATIENT)
Dept: PHYSICAL THERAPY | Age: 57
Discharge: HOME | End: 2022-05-10
Payer: COMMERCIAL

## 2022-05-10 PROCEDURE — 97016 VASOPNEUMATIC DEVICE THERAPY: CPT

## 2022-05-10 PROCEDURE — 97110 THERAPEUTIC EXERCISES: CPT

## 2022-05-10 NOTE — PROGRESS NOTES
PT DAILY TREATMENT NOTE 2-15    Patient Name: Esther Dozier.  Date:5/10/2022  : 1965  [x]  Patient  Verified  Payor: Anne  / Plan: [address] / Product Type: Managed Care Medicaid /    In time: 3:29  Out time: 4:44  Total Treatment Time (min): 75  Visit #:  10    Treatment Area: Status post shoulder surgery [Z98.890]    SUBJECTIVE  Pain Level (0-10 scale): 210  Any medication changes, allergies to medications, adverse drug reactions, diagnosis change, or new procedure performed?: [x] No    [] Yes (see summary sheet for update)  Subjective functional status/changes:     Pt states on  his arm was having sharp pain, but yesterday and today he's been working and lifting boards and not having any issues.     OBJECTIVE    Modality rationale: decrease edema, decrease inflammation and decrease pain to improve the patients ability to be able to perform AAROM   Min Type Additional Details       [] Estim: []Att   []Unatt    []TENS instruct                  []IFC  []Premod   []NMES                    []Other:  []w/US      []w/ heat  []w/ ice  Position:  Location:       []  Traction: [] Cervical       []Lumbar                       [] Prone          []Supine                       []Intermittent   []Continuous Lbs:  [] before manual  [] after manual  [] w/ heat  [] Simultaneously performed with w/ Estim    []  Ultrasound: []Continuous   [] Pulsed                       at: []1MHz   []3MHz Location:  W/cm2:    [] Paraffin         Location:   []w/heat    []  Ice     []  Heat  []  Ice massage Position:  Location:    []  Laser  []  Other: Position:  Location:     10 [x]  Vasopneumatic Device Pressure:       [x] lo [] med [] hi   [x] w/ ice      Temperature: 34deg  [] Simultaneously performed with w/ Estim     [x] Skin assessment post-treatment:  [x]intact []redness- no adverse reaction    []redness - adverse reaction:       65 min Therapeutic Exercise:  [x] See flow sheet : Rationale: increase ROM and increase strength to improve the patients ability to improve functional mobility         With   [x] TE   [] TA   [] neuro   [] other: Patient Education: [x] Review HEP    [] Progressed/Changed HEP based on:   [] positioning   [] body mechanics   [] transfers   [] heat/ice application    [] other:      Pain Level (0-10 scale) post treatment: 1/10    ASSESSMENT/Changes in Function: Patient tolerated treatment fairly well. Pt c/o pain mostly with pulleys behind back IR. Patient will continue to benefit from skilled PT services to address functional mobility deficits, address ROM deficits and address strength deficits to attain remaining goals. [x]  See Plan of Care  []  See progress note/recertification  []  See Discharge Summary         Progress towards goals / Updated goals:  Short Term Goals: To be accomplished in 6  treatments. To perform HEP for increasing PROM of right shoulder to within full range to maintain mobility - PARTIALLY MET  Patient will be able to tree and doff clothing overhead with no difficulty - PARTIALLY MET     Long Term Goals: To be accomplished in 12  Treatments. Patient will be able to place light object overhead on shelf with little difficulty - MET (able to reach cigarettes on shelf no issue)  Patient will be able to place hand on head with right hand with no difficulty - MET  Patient will be able to perform light job task - MET  Patient will be able to ride his motorcycle for 1-2 hours without pain greater than or equal to 3/10 to increase functional mobility.  (Added 4/29/2022)   Patient will be able to lay on ground/work under countertops to reach over his head without pain greater than or equal to 3/10 to increase functional mobility.  (Added 4/29/2022)  Patient will be able to lift 5-10# boards at work without pain greater than or equal to 3/10 to increase functional mobility.  (Added 4/29/2022)    PLAN  [x]  Upgrade activities as tolerated     [x] Continue plan of care  []  Update interventions per flow sheet       []  Discharge due to:_  []  Other:_      Loralie Kawasaki, PTA, LPTA 5/10/2022

## 2022-05-12 ENCOUNTER — APPOINTMENT (OUTPATIENT)
Dept: PHYSICAL THERAPY | Age: 57
End: 2022-05-12
Payer: COMMERCIAL

## 2022-05-13 ENCOUNTER — APPOINTMENT (OUTPATIENT)
Dept: PHYSICAL THERAPY | Age: 57
End: 2022-05-13
Payer: COMMERCIAL

## 2022-05-17 ENCOUNTER — HOSPITAL ENCOUNTER (OUTPATIENT)
Dept: PHYSICAL THERAPY | Age: 57
Discharge: HOME | End: 2022-05-17
Payer: COMMERCIAL

## 2022-05-17 PROCEDURE — 97110 THERAPEUTIC EXERCISES: CPT

## 2022-05-17 NOTE — PROGRESS NOTES
PT DAILY TREATMENT NOTE 2-15    Patient Name: Elida Brantley.  Date:2022  : 1965  [x]  Patient  Verified  Payor: Kariog 22 / Plan: [address] / Product Type: Managed Care Medicaid /    In time:3:30 PM  Out time:4:30 PM  Total Treatment Time (min): 30  Visit #:  11    Treatment Area: Status post shoulder surgery [Z98.890]    SUBJECTIVE  Pain Level (0-10 scale): 0/10  Any medication changes, allergies to medications, adverse drug reactions, diagnosis change, or new procedure performed?: [x] No    [] Yes (see summary sheet for update)  Subjective functional status/changes:     I returned to MD 3 weeks ago. He said if my shoulder is ok by the end of month, I do not have to keep my appointment. I am doing everything and I have a lot of jobs to complete. I am lifting 60 lb bags, 2 x 4 and 2 x 6 all day long. I can let today be my last day of therapy. OBJECTIVE       30 min Therapeutic Exercise:  [x] See flow sheet :   Rationale: increase ROM and increase strength to improve the patients ability to return to daily activities and work activities. With   [] TE   [] TA   [] neuro   [] other: Patient Education: [x] Review HEP    [] Progressed/Changed HEP based on:   [] positioning   [] body mechanics   [] transfers   [] heat/ice application    [] other:      Other Objective/Functional Measures:              Shoulder:   Strength AROM                     PROM       Right Left Right Left Right     Flexion 4/5 5/5 175 wfl 180     Abduction 4/5 5/5 160 wfl 170     IR 3+/5 5/5 70 wfl 70     ER 3+/5 5/5 60 wfl 60          Pain Level (0-10 scale) post treatment: 0/10    ASSESSMENT/Changes in Function:   Patient tolerated treatment. Patient has met the established goals and experiencing minimal pain with movements. Patient has good active motion especially in flexion and abduction. Slight range of motion deficit with internal and external rotation.  Strength is good in flexion and abduction. Patient will continue to work on internal and external rotation as part of HEP. Patient would like to be discharge because he is returning to daily and has a heavywork workload. Patient was shown how to improve internal and external rotation to full and advised to be careful of extreme heavy lifting 3 months post-op surgery and preventing re-injury. Patient will be discharged on his HEP and will continue on his own.address ROM deficits, address strength deficits, analyze and address soft tissue restrictions and analyze and cue movement patterns to attain remaining goals. [x]  See Plan of Care  []  See progress note/recertification  []  See Discharge Summary       Progress towards goals / Updated goals:    Short Term Goals: To be accomplished in 6  treatments. To perform HEP for increasing PROM of right shoulder to within full range to maintain mobility - PARTIALLY MET  Patient will be able to tree and doff clothing overhead with no difficulty - PARTIALLY MET      Long Term Goals: To be accomplished in 12  Treatments. Patient will be able to place light object overhead on shelf with little difficulty - MET (able to reach cigarettes on shelf no issue)  Patient will be able to place hand on head with right hand with no difficulty - MET  Patient will be able to perform light job task - MET  Patient will be able to ride his motorcycle for 1-2 hours without pain greater than or equal to 3/10 to increase functional mobility.  (Added 4/29/2022)   Patient will be able to lay on ground/work under countertops to reach over his head without pain greater than or equal to 3/10 to increase functional mobility.  (Added 4/29/2022) MET  Patient will be able to lift 5-10# boards at work without pain greater than or equal to 3/10 to increase functional mobility.  (Added 4/29/2022) MET      PLAN  [x]  Upgrade activities as tolerated     [x]  Continue plan of care  []  Update interventions per flow sheet       []  Discharge due to:_  []  Other:_      Luisa Singletary, PT,  5/17/2022

## 2022-05-18 DIAGNOSIS — E11.40 TYPE 2 DIABETES MELLITUS WITH DIABETIC NEUROPATHY, WITHOUT LONG-TERM CURRENT USE OF INSULIN (HCC): ICD-10-CM

## 2022-05-18 RX ORDER — METFORMIN HYDROCHLORIDE 500 MG/1
TABLET ORAL
Qty: 360 TABLET | Refills: 1 | Status: SHIPPED | OUTPATIENT
Start: 2022-05-18

## 2022-05-19 ENCOUNTER — APPOINTMENT (OUTPATIENT)
Dept: PHYSICAL THERAPY | Age: 57
End: 2022-05-19
Payer: COMMERCIAL

## 2022-06-07 NOTE — PROGRESS NOTES
[address]  [place], [address] [place]  Ph: [phone]    Fax: [phone]    Discharge Summary  2-15    Patient name: Javi Merritt Sr.  : 1965  Provider#: [id number]  Referral source: Diego Waters MD      Medical/Treatment Diagnosis: Status post shoulder surgery [Z98.890]     Prior Hospitalization: see medical history     Comorbidities: See Plan of Care  Prior Level of Function:See Plan of Care  Medications: Verified on Patient Summary List    Start of Care:3/30/22      Onset Date: 22     Visits from Start of Care:11 Missed Visits: 3    Reporting Period : 3/30/22 to 22      ASSESSMENT/SUMMARY OF CARE:   Assessment/ key information: 64year old white male referred to Physical Therapy following right RTC repair surgery on  22. Patient presents decrease range of motion and strength of the right shoulder in each plane during the initial visit. 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 Patient tolerated treatment. Patient has met the established goals and experiencing minimal pain with movements. Patient has good active motion especially in flexion and abduction. Slight range of motion deficit with internal and external rotation. Strength is good in flexion and abduction. Patient will continue to work on internal and external rotation as part of HEP. Patient requested to be  discharge because he is returning to daily and has a heavywork workload. Patient has shown improve with internal and external rotation to full range. He was advised to be careful of extreme heavy lifting 3 months post-op surgery and preventing re-injury. Patient has been discharged on his HEP and will continue on his own. Progress Toward Goals:  Short Term Goals: To be accomplished in 6  treatments.   To perform HEP for increasing PROM of right shoulder to within full range to maintain mobility - PARTIALLY MET  Patient will be able to tree and doff clothing overhead with no difficulty - PARTIALLY MET      Long Term Goals: To be accomplished in 12  Treatments. Patient will be able to place light object overhead on shelf with little difficulty - MET (able to reach cigarettes on shelf no issue)  Patient will be able to place hand on head with right hand with no difficulty - MET  Patient will be able to perform light job task - MET  Patient will be able to ride his motorcycle for 1-2 hours without pain greater than or equal to 3/10 to increase functional mobility.  (Added 4/29/2022)   Patient will be able to lay on ground/work under countertops to reach over his head without pain greater than or equal to 3/10 to increase functional mobility.  (Added 4/29/2022) MET  Patient will be able to lift 5-10# boards at work without pain greater than or equal to 3/10 to increase functional mobility.  (Added 4/29/2022) MET    RECOMMENDATIONS:  [x]Discontinue therapy: []Patient has reached or is progressing toward set goals      []Patient is non-compliant or has abdicated      []Due to lack of appreciable progress towards set goals      []Other      Suzanna Livers, PT  6/7/2022

## 2022-06-14 ENCOUNTER — OFFICE VISIT (OUTPATIENT)
Dept: CARDIOLOGY CLINIC | Age: 57
End: 2022-06-14

## 2022-06-14 DIAGNOSIS — E78.00 HYPERCHOLESTEROLEMIA: ICD-10-CM

## 2022-06-14 DIAGNOSIS — E29.1 MALE HYPOGONADISM: ICD-10-CM

## 2022-06-14 DIAGNOSIS — I10 HYPERTENSION, UNSPECIFIED TYPE: Primary | ICD-10-CM

## 2022-06-14 DIAGNOSIS — E11.40 TYPE 2 DIABETES MELLITUS WITH DIABETIC NEUROPATHY, WITHOUT LONG-TERM CURRENT USE OF INSULIN (HCC): ICD-10-CM

## 2022-06-14 NOTE — LETTER
6/16/2022    Patient: Julia Arauz. YOB: 1965   Date of Visit: 6/14/2022     Brayan Brice NP  [address]    Dear Brayan Brice NP,      Thank you for referring Mr. Audra Walls to [address] [facility] for evaluation. My notes for this consultation are attached. If you have questions, please do not hesitate to call me. I look forward to following your patient along with you.       Sincerely,    Ivan Mayfield MD

## 2022-06-14 NOTE — PATIENT INSTRUCTIONS

## 2022-06-14 NOTE — PROGRESS NOTES
CARDIOLOGY OFFICE NOTE    Smith Patino MD, [address], [place], [address] [facility]  Phone [phone]; Fax [phone]  Mobile [phone]   Voice Mail [phone]    Primary care: Daisy Garcia NP       ATTENTION:   This medical record was transcribed using an electronic medical records/speech recognition system. Although proofread, it may and can contain electronic, spelling and other errors. Corrections may be executed at a later time. Please feel free to contact us for any clarifications as needed. ICD-10-CM ICD-9-CM   1. Hypertension, unspecified type  I10 401.9   2. Hypercholesterolemia  E78.00 272.0   3. Type 2 diabetes mellitus with diabetic neuropathy, without long-term current use of insulin (HCC)  E11.40 250.60     357.2            Fiorella Easton Sr. is a 64 y.o. male with  referred for ***          Cardiac risk factors: {Causes; risk factors heart disease:510}  I have personally obtained the history from the patient. HISTORY OF PRESENTING ILLNESS      ***    He is now approximately 9 weeks status post right shoulder arthroscopic exam with rotator cuff repair, acromioplasty, distal clavicle resection, extensive debridement, and open biceps tenodesis.  His surgery was performed on 2/21/2022. He was last seen on 3/29/2022. The patient states that his pain level has improved since his last visit and surgical procedure. He rates the severity of his postoperative right shoulder pain as a 2 out of 10. He describes his pain is aching and intermittent. He reports taking no medication for his discomfort. The patient has been attending formal physical therapy postoperatively.   He has also been working on these exercises with an at-home exercise program.     ACTIVE PROBLEM LIST     Patient Active Problem List    Diagnosis Date Noted    Male hypogonadism 04/08/2022    Hoarse voice quality 04/08/2022    History of COVID-19 07/13/2021    Restless leg syndrome     Hypertension     Hypercholesterolemia     Type 2 diabetes mellitus with diabetic neuropathy (HCC)     GERD (gastroesophageal reflux disease)            PAST MEDICAL HISTORY     Past Medical History:   Diagnosis Date    Acid reflux     Diabetes ([address]     Diabetic neuropathy (HCC)     Hypercholesterolemia     Hypertension     Restless leg syndrome            PAST SURGICAL HISTORY     Past Surgical History:   Procedure Laterality Date    HX ENDOSCOPY  02/01/2021    HX ROTATOR CUFF REPAIR  02/21/2022          ALLERGIES     Allergies   Allergen Reactions    Aspirin Other (comments)     GI Upset    Pcn [Penicillins] Unknown (comments)     Family reported. FAMILY HISTORY     Family History   Problem Relation Age of Onset    Diabetes Mother     Heart Disease Mother     Heart Disease Father     negative for cardiac disease       SOCIAL HISTORY     Social History     Socioeconomic History    Marital status:    Tobacco Use    Smoking status: Current Every Day Smoker     Packs/day: 1.00     Types: Cigarettes    Smokeless tobacco: Never Used   Vaping Use    Vaping Use: Never used   Substance and Sexual Activity    Alcohol use: Not Currently    Drug use: Not Currently    Sexual activity: Yes         MEDICATIONS     Current Outpatient Medications   Medication Sig    metFORMIN (GLUCOPHAGE) 500 mg tablet TAKE 2 TABLETS BY MOUTH TWICE A DAY WITH MEALS    rosuvastatin (CRESTOR) 5 mg tablet Take 1 Tablet by mouth daily.  testosterone cypionate (DEPOTESTOTERONE CYPIONATE) 200 mg/mL injection INJECT 1 ML IN THE MUSCLE EVERY 14 DAYS( MAXIMUM DAILY AMOUNT IS 1 ML)    nitroglycerin (NITROSTAT) 0.3 mg SL tablet 1 Tablet by SubLINGual route every five (5) minutes as needed for Chest Pain. Up to 3 doses.     metoprolol tartrate (LOPRESSOR) 100 mg IR tablet TAKE ONE TABLET BY MOUTH EVERY DAY    gabapentin (NEURONTIN) 800 mg tablet TAKE ONE TABLET BY MOUTH THREE TIMES DAILY (max OF 2,400mg daily)    omeprazole (PRILOSEC) 40 mg capsule Take 1 Capsule by mouth daily.  hydroCHLOROthiazide (MICROZIDE) 12.5 mg capsule Take 1 Capsule by mouth daily.  sucralfate (CARAFATE) 1 gram tablet TAKE 1 TABLET BY MOUTH TWICE DAILY AS NEEDED(GERD). CRUSH INTO 3 TABLESPOONSFUL OF WATER TO MAKE LOOSE WATERY MIXTURE.  Stool Softener 100 mg capsule Take 100 mg by mouth daily.  albuterol (PROVENTIL HFA, VENTOLIN HFA, PROAIR HFA) 90 mcg/actuation inhaler Take 2 Puffs by inhalation every four (4) hours as needed for Wheezing.  syringe with needle (Syringe 3cc/01Vw3-2/2\") 3 mL 21 gauge x 1 1/2\" syrg For the administration of 1 mL testosterone every 2 weeks. Diagnosis code E29.1.  buprenorphine-naloxone (SUBOXONE) 8-2 mg film sublingaul film dissolve 1 film under the tongue TWICE DAILY     No current facility-administered medications for this visit. I have reviewed the nurses notes, vitals, problem list, allergy list, medical history, family, social history and medications. REVIEW OF SYMPTOMS      General: Pt denies excessive weight gain or loss. Pt is able to conduct ADL's  HEENT: Denies blurred vision, headaches, hearing loss, epistaxis and difficulty swallowing. Respiratory: Denies cough, congestion, shortness of breath, DAMON, wheezing or stridor. Cardiovascular: Denies precordial pain, palpitations, edema or PND  Gastrointestinal: Denies poor appetite, indigestion, abdominal pain or blood in stool  Genitourinary: Denies hematuria, dysuria, increased urinary frequency  Musculoskeletal: Denies joint pain or swelling from muscles or joints  Neurologic: Denies tremor, paresthesias, headache, or sensory motor disturbance  Psychiatric: Denies confusion, insomnia, depression  Integumentray: Denies rash, itching or ulcers. Hematologic: Denies easy bruising, bleeding     PHYSICAL EXAMINATION      There were no vitals filed for this visit.   General: Well developed, in no acute distress. HEENT: No jaundice, oral mucosa moist, no oral ulcers  Neck: Supple, no stiffness, no lymphadenopathy, supple  Heart:  Normal S1/S2 negative S3 or S4. Regular, no murmur, gallop or rub, no jugular venous distention  Respiratory: Clear bilaterally x 4, no wheezing or rales  Abdomen:   Soft, non-tender, bowel sounds are active. Extremities:  No edema, normal cap refill, no cyanosis. Musculoskeletal: No clubbing, no deformities  Neuro: A&Ox3, speech clear, gait stable, cooperative, no focal neurologic deficits  Skin: Skin color is normal. No rashes or lesions. Non diaphoretic, moist.  Vascular: 2+ pulses symmetric in all extremities        EKG: ***     DIAGNOSTIC DATA     1. Lipids  4/8/22- TC 92, HDL 27, LDL 38,          LABORATORY DATA            Lab Results   Component Value Date/Time    WBC 10.8 04/08/2022 12:00 AM    HGB 15.1 04/08/2022 12:00 AM    HCT 44.8 04/08/2022 12:00 AM    PLATELET 225 48/73/2086 12:00 AM    MCV 93 04/08/2022 12:00 AM      Lab Results   Component Value Date/Time    Sodium 140 04/08/2022 12:00 AM    Potassium 4.8 04/08/2022 12:00 AM    Chloride 98 04/08/2022 12:00 AM    CO2 23 04/08/2022 12:00 AM    Anion gap 9 06/30/2021 10:28 AM    Glucose 153 (H) 04/08/2022 12:00 AM    BUN 8 04/08/2022 12:00 AM    Creatinine 0.65 (L) 04/08/2022 12:00 AM    BUN/Creatinine ratio 12 04/08/2022 12:00 AM    GFR est AA >60 06/30/2021 10:28 AM    GFR est non-AA >60 06/30/2021 10:28 AM    Calcium 9.2 04/08/2022 12:00 AM    Bilirubin, total 0.9 04/08/2022 12:00 AM    Alk. phosphatase 90 04/08/2022 12:00 AM    Protein, total 7.2 04/08/2022 12:00 AM    Albumin 4.4 04/08/2022 12:00 AM    Globulin 3.5 06/30/2021 10:28 AM    A-G Ratio 1.6 04/08/2022 12:00 AM    ALT (SGPT) 24 04/08/2022 12:00 AM             ASSESSMENT/RECOMMENDATIONS:.      1.  Chest discomfort  2. Dyslipidemia  3. Hypertension  4.   T2DM with diabetic neuropathy    No orders of the defined types were placed in this encounter. We discussed the expected course, resolution and complications of the diagnosis(es) in detail. Medication risks, benefits, costs, interactions, and alternatives were discussed as indicated. I advised him to contact the office if his condition worsens, changes or fails to improve as anticipated. He expressed understanding with the diagnosis(es) and plan          Follow-up and Dispositions  ·   Return in about 6 weeks (around 7/26/2022). I have discussed the diagnosis with  Javi Merritt Sr. and the intended plan as seen in the above orders. Questions were answered concerning future plans. I have discussed medication side effects and warnings with the patient as well. Thank you,  Ainsley Fallon NP for involving me in the care of  [address] [place] [address]. . Please do not hesitate to contact me for further questions/concerns. Smith Black MD, [address] [place] [address], [address] [place] [address] [facility]      [phone] / [phone] Fax

## 2022-06-15 RX ORDER — TESTOSTERONE CYPIONATE 200 MG/ML
INJECTION INTRAMUSCULAR
Qty: 10 ML | Refills: 0 | Status: SHIPPED | OUTPATIENT
Start: 2022-06-15

## 2022-06-30 ENCOUNTER — OFFICE VISIT (OUTPATIENT)
Dept: CARDIOLOGY CLINIC | Age: 57
End: 2022-06-30
Payer: COMMERCIAL

## 2022-06-30 VITALS
WEIGHT: 207 LBS | HEIGHT: 68 IN | BODY MASS INDEX: 31.37 KG/M2 | SYSTOLIC BLOOD PRESSURE: 126 MMHG | DIASTOLIC BLOOD PRESSURE: 80 MMHG | OXYGEN SATURATION: 94 % | HEART RATE: 74 BPM

## 2022-06-30 DIAGNOSIS — R06.2 WHEEZING: ICD-10-CM

## 2022-06-30 DIAGNOSIS — R07.9 CHEST PAIN, UNSPECIFIED TYPE: ICD-10-CM

## 2022-06-30 DIAGNOSIS — I10 HYPERTENSION, UNSPECIFIED TYPE: Primary | ICD-10-CM

## 2022-06-30 PROCEDURE — 99204 OFFICE O/P NEW MOD 45 MIN: CPT | Performed by: INTERNAL MEDICINE

## 2022-06-30 PROCEDURE — 93000 ELECTROCARDIOGRAM COMPLETE: CPT | Performed by: INTERNAL MEDICINE

## 2022-06-30 RX ORDER — METHYLPREDNISOLONE 4 MG/1
TABLET ORAL
Qty: 1 DOSE PACK | Refills: 0 | Status: SHIPPED | OUTPATIENT
Start: 2022-06-30 | End: 2022-07-21

## 2022-06-30 NOTE — PROGRESS NOTES
Per Dr. Ellsworth Exon- f/u in 1-2 weeks with same day Nuclear  Exercise stress test.  Cough present and steroid sent to pharmacy and ref. to pulmonary.

## 2022-06-30 NOTE — PROGRESS NOTES
Hayes Baumgarten Rosalinda Burnet MD, MS, FACC            HISTORY OF PRESENT ILLNESS:    Georganna Essex Sr. is a 64 y.o. male referred for chest pain. Miesha zepeda, manages properties. Exposed to pesticide Monday morning, was treating roaches off, rodríguez bombs, walked through the room. Feels congested with phlegm. PMH HTN, DM2, tobacco use. Started smoking ate 10-13, stopped for eight years, restarted two years ago. 1.5PPD.    ++ CP with over exertion, pounding, had to stop, catch his breath. Felt like something locked up. Mostly during intercourse with wife. Everyday, denies any symptoms. Mom and Dad heart disease, unclear - heart attack or two. Seems had CHF too. Intermittant ETOH, has not been drinking for two years. LDL 38. Neuropathy, on suboxone for this. Is wheezing significantly - offered steroid dose yumiko for probably COPD, referral to pulmonary. Also suggested RAMONE given claudication like symptoms, he will think about it. SUMMARY:   Problem List  Date Reviewed: 6/30/2022          Codes Class Noted    Male hypogonadism ICD-10-CM: E29.1  ICD-9-CM: 257.2  4/8/2022        Hoarse voice quality ICD-10-CM: R49.0  ICD-9-CM: 784.42  4/8/2022    Overview Signed 4/8/2022  8:43 AM by Nohemi Moreland, EMRE     Has had hoarse voice since having his tonsils and adenoids removed at age 5. Hemorrhaged and had a complication. Must have damaged the larynx but hoarse quality of voice has been present ever since. Has had a polyp removed by ENT approx 2017.              History of COVID-19 ICD-10-CM: V64.51  ICD-9-CM: V12.09  7/13/2021        Restless leg syndrome ICD-10-CM: G25.81  ICD-9-CM: 333.94  Unknown        Hypertension ICD-10-CM: I10  ICD-9-CM: 401.9  Unknown        Hypercholesterolemia ICD-10-CM: E78.00  ICD-9-CM: 272.0  Unknown        Type 2 diabetes mellitus with diabetic neuropathy ([facility] ICD-10-CM: E11.40  ICD-9-CM: 250.60, 357.2  Unknown        GERD (gastroesophageal reflux disease) ICD-10-CM: K21.9  ICD-9-CM: 530.81  Unknown              Current Outpatient Medications on File Prior to Visit   Medication Sig    testosterone cypionate (DEPOTESTOTERONE CYPIONATE) 200 mg/mL injection INJECT 1 ML IN THE MUSCLE EVERY 14 DAYS( MAXIMUM DAILY AMOUNT IS 1 ML)    metFORMIN (GLUCOPHAGE) 500 mg tablet TAKE 2 TABLETS BY MOUTH TWICE A DAY WITH MEALS    rosuvastatin (CRESTOR) 5 mg tablet Take 1 Tablet by mouth daily.  nitroglycerin (NITROSTAT) 0.3 mg SL tablet 1 Tablet by SubLINGual route every five (5) minutes as needed for Chest Pain. Up to 3 doses.  metoprolol tartrate (LOPRESSOR) 100 mg IR tablet TAKE ONE TABLET BY MOUTH EVERY DAY    gabapentin (NEURONTIN) 800 mg tablet TAKE ONE TABLET BY MOUTH THREE TIMES DAILY (max OF 2,400mg daily)    omeprazole (PRILOSEC) 40 mg capsule Take 1 Capsule by mouth daily.  hydroCHLOROthiazide (MICROZIDE) 12.5 mg capsule Take 1 Capsule by mouth daily.  sucralfate (CARAFATE) 1 gram tablet TAKE 1 TABLET BY MOUTH TWICE DAILY AS NEEDED(GERD). CRUSH INTO 3 TABLESPOONSFUL OF WATER TO MAKE LOOSE WATERY MIXTURE.  Stool Softener 100 mg capsule Take 100 mg by mouth daily.  albuterol (PROVENTIL HFA, VENTOLIN HFA, PROAIR HFA) 90 mcg/actuation inhaler Take 2 Puffs by inhalation every four (4) hours as needed for Wheezing.  syringe with needle (Syringe 3cc/37Kr8-2/2\") 3 mL 21 gauge x 1 1/2\" syrg For the administration of 1 mL testosterone every 2 weeks. Diagnosis code E29.1.  buprenorphine-naloxone (SUBOXONE) 8-2 mg film sublingaul film three (3) times daily. No current facility-administered medications on file prior to visit. CARDIOLOGY STUDIES TO DATE:  No results found for this visit on 06/30/22.               Chief Complaint   Patient presents with    Chest Pain    New Patient       CARDIAC ROS:   positive for chest pain, chest pressure/discomfort, dyspnea, irregular heart beats, exertional chest pressure/discomfort, claudication    Past Medical History:   Diagnosis Date    Acid reflux     Diabetes ([facility]     Diabetic neuropathy (HCC)     Hypercholesterolemia     Hypertension     Restless leg syndrome        Family History   Problem Relation Age of Onset    Diabetes Mother     Heart Disease Mother     Heart Disease Father        Social History     Socioeconomic History    Marital status:      Spouse name: Not on file    Number of children: Not on file    Years of education: Not on file    Highest education level: Not on file   Occupational History    Not on file   Tobacco Use    Smoking status: Current Every Day Smoker     Packs/day: 1.00     Types: Cigarettes    Smokeless tobacco: Never Used   Vaping Use    Vaping Use: Never used   Substance and Sexual Activity    Alcohol use: Not Currently    Drug use: Not Currently    Sexual activity: Yes   Other Topics Concern    Not on file   Social History Narrative    Not on file     Social Determinants of Health     Financial Resource Strain:     Difficulty of Paying Living Expenses: Not on file   Food Insecurity:     Worried About Running Out of Food in the Last Year: Not on file    Adalid of Food in the Last Year: Not on file   Transportation Needs:     Lack of Transportation (Medical): Not on file    Lack of Transportation (Non-Medical):  Not on file   Physical Activity:     Days of Exercise per Week: Not on file    Minutes of Exercise per Session: Not on file   Stress:     Feeling of Stress : Not on file   Social Connections:     Frequency of Communication with Friends and Family: Not on file    Frequency of Social Gatherings with Friends and Family: Not on file    Attends Jainism Services: Not on file    Active Member of Clubs or Organizations: Not on file    Attends Club or Organization Meetings: Not on file    Marital Status: Not on file   Intimate Partner Violence:     Fear of Current or Ex-Partner: Not on file    Emotionally Abused: Not on file    Physically Abused: Not on file    Sexually Abused: Not on file   Housing Stability:     Unable to Pay for Housing in the Last Year: Not on file    Number of Places Lived in the Last Year: Not on file    Unstable Housing in the Last Year: Not on file        GENERAL ROS:  A comprehensive review of systems was negative except for that written in the HPI.     Visit Vitals  /80 (BP 1 Location: Left upper arm, BP Patient Position: Sitting, BP Cuff Size: Adult)   Pulse 74   Ht 5' 8\" (1.727 m)   Wt 207 lb (93.9 kg)   SpO2 94%   BMI 31.47 kg/m²       Wt Readings from Last 3 Encounters:   06/30/22 207 lb (93.9 kg)   04/08/22 207 lb (93.9 kg)   03/10/22 205 lb (93 kg)            BP Readings from Last 3 Encounters:   06/30/22 126/80   04/08/22 119/73   09/28/21 135/70       PHYSICAL EXAM  General appearance: alert, cooperative, no distress, appears stated age  Neck: supple, symmetrical, trachea midline, no adenopathy, thyroid: not enlarged, symmetric, no tenderness/mass/nodules, no carotid bruit and no JVD  Lungs: clear to auscultation bilaterally  Heart: regular rate and rhythm, S1, S2 normal, no murmur, click, rub or gallop  Extremities: extremities normal, atraumatic, no cyanosis or edema    Lab Results   Component Value Date/Time    Cholesterol, total 92 (L) 04/08/2022 12:00 AM    Cholesterol, total 183 01/22/2021 08:50 AM    Cholesterol, total 132 08/12/2020 10:51 AM    Cholesterol, total 102 12/30/2019 03:19 PM    HDL Cholesterol 27 (L) 04/08/2022 12:00 AM    HDL Cholesterol 40 01/22/2021 08:50 AM    HDL Cholesterol 31 08/12/2020 10:51 AM    HDL Cholesterol 38 12/30/2019 03:19 PM    LDL, calculated 38 04/08/2022 12:00 AM    LDL, calculated 94.8 01/22/2021 08:50 AM    LDL, calculated 71.6 08/12/2020 10:51 AM    LDL, calculated 41.4 12/30/2019 03:19 PM    Triglyceride 158 (H) 04/08/2022 12:00 AM    Triglyceride 241 (H) 01/22/2021 08:50 AM    Triglyceride 147 08/12/2020 10:51 AM    Triglyceride 113 12/30/2019 03:19 PM    CHOL/HDL Ratio 4.6 01/22/2021 08:50 AM    CHOL/HDL Ratio 4.3 08/12/2020 10:51 AM    CHOL/HDL Ratio 2.7 12/30/2019 03:19 PM       ASSESSMENT  Diagnoses and all orders for this visit:    1. Hypertension, unspecified type  -     AMB POC EKG ROUTINE W/ 12 LEADS, INTER & REP    2. Chest pain, unspecified type  -     AMB POC EKG ROUTINE W/ 12 LEADS, INTER & REP  -     NUCLEAR CARDIAC STRESS TEST; Future    3. Wheezing  -     methylPREDNISolone (Medrol, Fady,) 4 mg tablet; Medrol dose fady for COPD exacerbation.  -     REFERRAL TO PULMONARY DISEASE        Encounter Diagnoses   Name Primary?  Hypertension, unspecified type Yes    Chest pain, unspecified type     Wheezing      Orders Placed This Encounter   [facility]    AMB POC EKG ROUTINE W/ 12 LEADS, INTER & REP    methylPREDNISolone (Medrol, Fady,) 4 mg tablet       Follow-up and Dispositions    · Return in about 2 weeks (around 7/14/2022) for with stress same day. Naya Meek MD  6/30/2022        [address]   [address]  [address]  [phone] (F)    [address]  [phone] (P)  [phone] (F)    ATTENTION:   This medical record was transcribed using an electronic medical records/speech recognition system. Although proofread, it may and can contain electronic, spelling and other errors. Corrections may be executed at a later time. Please feel free to contact us for any clarifications as needed.

## 2022-06-30 NOTE — PROGRESS NOTES
Whitney Douglas is a 64 y.o. male    Visit Vitals  /80 (BP 1 Location: Left upper arm, BP Patient Position: Sitting, BP Cuff Size: Adult)   Pulse 74   Ht 5' 8\" (1.727 m)   Wt 207 lb (93.9 kg)   SpO2 94%   BMI 31.47 kg/m²       Chief Complaint   Patient presents with    Chest Pain    New Patient       Chest pain YES  SOB YES, COUGHING A LOT  Dizziness NO  Swelling NO  Recent hospital visit NO  Refills NO  COVID VACCINE STATUS NO  HAD COVID?  YES

## 2022-06-30 NOTE — LETTER
6/30/2022    Patient: Aram Walls. YOB: 1965   Date of Visit: 6/30/2022     Huan Quinones NP  [address]    Dear Huan Quniones NP,      Thank you for referring Mr. Shante Bridges to [address] [place] [address] for evaluation. My notes for this consultation are attached. If you have questions, please do not hesitate to call me. I look forward to following your patient along with you.       Sincerely,    Naya Meek MD

## 2022-07-20 ENCOUNTER — TELEPHONE (OUTPATIENT)
Dept: CARDIOLOGY CLINIC | Age: 57
End: 2022-07-20

## 2022-07-20 NOTE — TELEPHONE ENCOUNTER
Left message for nuclear stress test appointment reminder including date, time, location, prep, and duration of test.

## 2022-07-21 ENCOUNTER — OFFICE VISIT (OUTPATIENT)
Dept: CARDIOLOGY CLINIC | Age: 57
End: 2022-07-21

## 2022-07-21 ENCOUNTER — ANCILLARY PROCEDURE (OUTPATIENT)
Dept: CARDIOLOGY CLINIC | Age: 57
End: 2022-07-21
Payer: COMMERCIAL

## 2022-07-21 VITALS — HEIGHT: 68 IN | WEIGHT: 207 LBS | BODY MASS INDEX: 31.37 KG/M2

## 2022-07-21 VITALS
HEART RATE: 82 BPM | WEIGHT: 212.4 LBS | SYSTOLIC BLOOD PRESSURE: 138 MMHG | OXYGEN SATURATION: 92 % | BODY MASS INDEX: 32.19 KG/M2 | DIASTOLIC BLOOD PRESSURE: 64 MMHG | HEIGHT: 68 IN

## 2022-07-21 DIAGNOSIS — E78.00 HYPERCHOLESTEROLEMIA: ICD-10-CM

## 2022-07-21 DIAGNOSIS — R07.9 CHEST PAIN, UNSPECIFIED TYPE: ICD-10-CM

## 2022-07-21 DIAGNOSIS — E11.40 TYPE 2 DIABETES MELLITUS WITH DIABETIC NEUROPATHY, WITHOUT LONG-TERM CURRENT USE OF INSULIN (HCC): ICD-10-CM

## 2022-07-21 DIAGNOSIS — I10 HYPERTENSION, UNSPECIFIED TYPE: Primary | ICD-10-CM

## 2022-07-21 DIAGNOSIS — Z72.0 TOBACCO ABUSE: ICD-10-CM

## 2022-07-21 LAB
NUC STRESS EJECTION FRACTION: 54 %
STRESS ANGINA INDEX: 0
STRESS BASELINE DIAS BP: 78 MMHG
STRESS BASELINE HR: 76 BPM
STRESS BASELINE ST DEPRESSION: 0 MM
STRESS BASELINE SYS BP: 128 MMHG
STRESS ESTIMATED WORKLOAD: 10.1 METS
STRESS EXERCISE DUR MIN: 8 MIN
STRESS EXERCISE DUR SEC: 0 SEC
STRESS O2 SAT PEAK: 95 %
STRESS O2 SAT REST: 96 %
STRESS PEAK DIAS BP: 86 MMHG
STRESS PEAK SYS BP: 164 MMHG
STRESS PERCENT HR ACHIEVED: 84 %
STRESS POST PEAK HR: 137 BPM
STRESS RATE PRESSURE PRODUCT: NORMAL BPM*MMHG
STRESS SR DUKE TREADMILL SCORE: 8
STRESS ST DEPRESSION: 0 MM
STRESS TARGET HR: 164 BPM

## 2022-07-21 PROCEDURE — A9500 TC99M SESTAMIBI: HCPCS | Performed by: INTERNAL MEDICINE

## 2022-07-21 PROCEDURE — 93015 CV STRESS TEST SUPVJ I&R: CPT | Performed by: INTERNAL MEDICINE

## 2022-07-21 PROCEDURE — 3051F HG A1C>EQUAL 7.0%<8.0%: CPT | Performed by: INTERNAL MEDICINE

## 2022-07-21 PROCEDURE — 78452 HT MUSCLE IMAGE SPECT MULT: CPT | Performed by: INTERNAL MEDICINE

## 2022-07-21 PROCEDURE — 99214 OFFICE O/P EST MOD 30 MIN: CPT | Performed by: INTERNAL MEDICINE

## 2022-07-21 RX ORDER — TETRAKIS(2-METHOXYISOBUTYLISOCYANIDE)COPPER(I) TETRAFLUOROBORATE 1 MG/ML
26 INJECTION, POWDER, LYOPHILIZED, FOR SOLUTION INTRAVENOUS ONCE
Status: COMPLETED | OUTPATIENT
Start: 2022-07-21 | End: 2022-07-21

## 2022-07-21 RX ORDER — TETRAKIS(2-METHOXYISOBUTYLISOCYANIDE)COPPER(I) TETRAFLUOROBORATE 1 MG/ML
8.8 INJECTION, POWDER, LYOPHILIZED, FOR SOLUTION INTRAVENOUS ONCE
Status: COMPLETED | OUTPATIENT
Start: 2022-07-21 | End: 2022-07-21

## 2022-07-21 RX ADMIN — TETRAKIS(2-METHOXYISOBUTYLISOCYANIDE)COPPER(I) TETRAFLUOROBORATE 26 MILLICURIE: 1 INJECTION, POWDER, LYOPHILIZED, FOR SOLUTION INTRAVENOUS at 10:35

## 2022-07-21 RX ADMIN — TETRAKIS(2-METHOXYISOBUTYLISOCYANIDE)COPPER(I) TETRAFLUOROBORATE 8.8 MILLICURIE: 1 INJECTION, POWDER, LYOPHILIZED, FOR SOLUTION INTRAVENOUS at 09:20

## 2022-07-21 NOTE — PROGRESS NOTES
Chief Complaint   Patient presents with    Follow-up    Other     Testing     Chest Pain    Cholesterol Problem     There were no vitals filed for this visit. Chest pain tightness after treadmill test today   SOB denied   Palpitations during testing   Swelling in hands/feet denied   Dizziness denied   Recent hospital stays denied   Refills denied     Pulse ox 90-92 for a period of 2 minutes to monitor   Patient is a current smoker- plans to quit-smoking education provided.

## 2022-07-21 NOTE — PROGRESS NOTES
Kenton Simmons MD, MS, [facility]            HISTORY OF PRESENT ILLNESS:    Alex Romero Sr. is a 64 y.o. male referred for chest pain. Miesha zepeda, manages properties. Exposed to pesticide Monday morning, was treating roaches off, rodríguez bombs, walked through the room. Feels congested with phlegm. PMH HTN, DM2, tobacco use. Started smoking ate 10-13, stopped for eight years, restarted two years ago. 1.5PPD.    ++ CP with over exertion, pounding, had to stop, catch his breath. Felt like something locked up. Mostly during intercourse with wife. Everyday, denies any symptoms. Mom and Dad heart disease, unclear - heart attack or two. Seems had CHF too. Intermittant ETOH, has not been drinking for two years. LDL 38. Neuropathy, on suboxone for this. Was wheezing significantly - offered steroid dose yumiko for probably COPD, referral to pulmonary. Feels better after steroid taper. Also suggested RAMONE given claudication like symptoms, he will think about it. One day nuc stress done prior to his appt reviewed - suprisingly normal perfusion, normal LV EF58%. Discussed his significant risk factors - if he were to continue to have CP, would pursue cardiac cath. Goven normal stress, would continue medical management, manage risk factors. SUMMARY:   Problem List  Date Reviewed: 6/30/2022            Codes Class Noted    Chest pain ICD-10-CM: R07.9  ICD-9-CM: 786.50  7/21/2022        Tobacco abuse ICD-10-CM: Z72.0  ICD-9-CM: 305.1  7/21/2022        Male hypogonadism ICD-10-CM: E29.1  ICD-9-CM: 257.2  4/8/2022        Hoarse voice quality ICD-10-CM: R49.0  ICD-9-CM: 784.42  4/8/2022    Overview Signed 4/8/2022  8:43 AM by Laura Treadwell NP     Has had hoarse voice since having his tonsils and adenoids removed at age 5. Hemorrhaged and had a complication. Must have damaged the larynx but hoarse quality of voice has been present ever since.    Has had a polyp removed by ENT approx 2017. History of COVID-19 ICD-10-CM: Z86.16  ICD-9-CM: V12.09  7/13/2021        Restless leg syndrome ICD-10-CM: G25.81  ICD-9-CM: 333.94  Unknown        Hypertension ICD-10-CM: I10  ICD-9-CM: 401.9  Unknown        Hypercholesterolemia ICD-10-CM: E78.00  ICD-9-CM: 272.0  Unknown        Type 2 diabetes mellitus with diabetic neuropathy (HCC) ICD-10-CM: E11.40  ICD-9-CM: 250.60, 357.2  Unknown        GERD (gastroesophageal reflux disease) ICD-10-CM: K21.9  ICD-9-CM: 530.81  Unknown           Current Outpatient Medications on File Prior to Visit   Medication Sig    testosterone cypionate (DEPOTESTOTERONE CYPIONATE) 200 mg/mL injection INJECT 1 ML IN THE MUSCLE EVERY 14 DAYS( MAXIMUM DAILY AMOUNT IS 1 ML)    metFORMIN (GLUCOPHAGE) 500 mg tablet TAKE 2 TABLETS BY MOUTH TWICE A DAY WITH MEALS    rosuvastatin (CRESTOR) 5 mg tablet Take 1 Tablet by mouth daily. nitroglycerin (NITROSTAT) 0.3 mg SL tablet 1 Tablet by SubLINGual route every five (5) minutes as needed for Chest Pain. Up to 3 doses. metoprolol tartrate (LOPRESSOR) 100 mg IR tablet TAKE ONE TABLET BY MOUTH EVERY DAY    gabapentin (NEURONTIN) 800 mg tablet TAKE ONE TABLET BY MOUTH THREE TIMES DAILY (max OF 2,400mg daily)    omeprazole (PRILOSEC) 40 mg capsule Take 1 Capsule by mouth daily. hydroCHLOROthiazide (MICROZIDE) 12.5 mg capsule Take 1 Capsule by mouth daily. Stool Softener 100 mg capsule Take 100 mg by mouth daily. albuterol (PROVENTIL HFA, VENTOLIN HFA, PROAIR HFA) 90 mcg/actuation inhaler Take 2 Puffs by inhalation every four (4) hours as needed for Wheezing. syringe with needle (Syringe 3cc/24Kh8-8/2\") 3 mL 21 gauge x 1 1/2\" syrg For the administration of 1 mL testosterone every 2 weeks. Diagnosis code E29.1.    buprenorphine-naloxone (SUBOXONE) 8-2 mg film sublingaul film three (3) times daily. sucralfate (CARAFATE) 1 gram tablet TAKE 1 TABLET BY MOUTH TWICE DAILY AS NEEDED(GERD).  CRUSH INTO 3 TABLESPOONSFUL OF WATER TO MAKE LOOSE WATERY MIXTURE. (Patient not taking: Reported on 7/21/2022)     Current Facility-Administered Medications on File Prior to Visit   Medication    [COMPLETED] technetium sestamibi TC 99M (CARDIOLITE) injection 8.8 millicurie    [COMPLETED] technetium sestamibi TC 99M (CARDIOLITE) injection 26 millicurie       CARDIOLOGY STUDIES TO DATE:  No results found for this visit on 07/21/22.               Chief Complaint   Patient presents with    Follow-up    Other     Testing     Chest Pain    Cholesterol Problem         CARDIAC ROS:   positive for chest pain, chest pressure/discomfort, dyspnea, irregular heart beats, exertional chest pressure/discomfort, claudication    Past Medical History:   Diagnosis Date    Acid reflux     Diabetes (HCC)     Diabetic neuropathy (HCC)     Hypercholesterolemia     Hypertension     Restless leg syndrome        Family History   Problem Relation Age of Onset    Diabetes Mother     Heart Disease Mother     Heart Disease Father        Social History     Socioeconomic History    Marital status:      Spouse name: Not on file    Number of children: Not on file    Years of education: Not on file    Highest education level: Not on file   Occupational History    Not on file   Tobacco Use    Smoking status: Every Day     Packs/day: 1.00     Types: Cigarettes    Smokeless tobacco: Never   Vaping Use    Vaping Use: Never used   Substance and Sexual Activity    Alcohol use: Not Currently    Drug use: Not Currently    Sexual activity: Yes   Other Topics Concern    Not on file   Social History Narrative    Not on file     Social Determinants of Health     Financial Resource Strain: Not on file   Food Insecurity: Not on file   Transportation Needs: Not on file   Physical Activity: Not on file   Stress: Not on file   Social Connections: Not on file   Intimate Partner Violence: Not on file   Housing Stability: Not on file        GENERAL ROS:  A comprehensive review of systems was negative except for that written in the HPI. Visit Vitals  /64 (BP 1 Location: Left upper arm, BP Patient Position: Sitting, BP Cuff Size: Adult)   Pulse 82   Ht 5' 8\" (1.727 m)   Wt 212 lb 6.4 oz (96.3 kg)   SpO2 92%   BMI 32.30 kg/m²         Wt Readings from Last 3 Encounters:   07/21/22 212 lb 6.4 oz (96.3 kg)   07/21/22 207 lb (93.9 kg)   06/30/22 207 lb (93.9 kg)            BP Readings from Last 3 Encounters:   07/21/22 138/64   06/30/22 126/80   04/08/22 119/73       PHYSICAL EXAM  General appearance: alert, cooperative, no distress, appears stated age  Neck: supple, symmetrical, trachea midline, no adenopathy, thyroid: not enlarged, symmetric, no tenderness/mass/nodules, no carotid bruit and no JVD  Lungs: clear to auscultation bilaterally  Heart: regular rate and rhythm, S1, S2 normal, no murmur, click, rub or gallop  Extremities: extremities normal, atraumatic, no cyanosis or edema    Lab Results   Component Value Date/Time    Cholesterol, total 92 (L) 04/08/2022 12:00 AM    Cholesterol, total 183 01/22/2021 08:50 AM    Cholesterol, total 132 08/12/2020 10:51 AM    Cholesterol, total 102 12/30/2019 03:19 PM    HDL Cholesterol 27 (L) 04/08/2022 12:00 AM    HDL Cholesterol 40 01/22/2021 08:50 AM    HDL Cholesterol 31 08/12/2020 10:51 AM    HDL Cholesterol 38 12/30/2019 03:19 PM    LDL, calculated 38 04/08/2022 12:00 AM    LDL, calculated 94.8 01/22/2021 08:50 AM    LDL, calculated 71.6 08/12/2020 10:51 AM    LDL, calculated 41.4 12/30/2019 03:19 PM    Triglyceride 158 (H) 04/08/2022 12:00 AM    Triglyceride 241 (H) 01/22/2021 08:50 AM    Triglyceride 147 08/12/2020 10:51 AM    Triglyceride 113 12/30/2019 03:19 PM    CHOL/HDL Ratio 4.6 01/22/2021 08:50 AM    CHOL/HDL Ratio 4.3 08/12/2020 10:51 AM    CHOL/HDL Ratio 2.7 12/30/2019 03:19 PM       ASSESSMENT  Diagnoses and all orders for this visit:    1. Hypertension, unspecified type    2. Hypercholesterolemia    3.  Type 2 diabetes mellitus with diabetic neuropathy, without long-term current use of insulin (HCC)    4. Chest pain, unspecified type    5. Tobacco abuse        Encounter Diagnoses   Name Primary? Hypertension, unspecified type Yes    Hypercholesterolemia     Type 2 diabetes mellitus with diabetic neuropathy, without long-term current use of insulin (HCC)     Chest pain, unspecified type     Tobacco abuse        No orders of the defined types were placed in this encounter. Vincent Marquis MD  7/21/2022        [address] [place] [address]   [address],[facility] [address]  [address]  [phone] (F)    [address] [place] [address] Nw  [phone] (P)  [phone] (F)    ATTENTION:   This medical record was transcribed using an electronic medical records/speech recognition system. Although proofread, it may and can contain electronic, spelling and other errors. Corrections may be executed at a later time. Please feel free to contact us for any clarifications as needed.

## 2022-07-21 NOTE — LETTER
7/21/2022    Patient: Rafael Chairez. YOB: 1965   Date of Visit: 7/21/2022     Deo Carrasco NP  [address]    Dear Deo Carrasco NP,      Thank you for referring Mr. Lucas Babb to [address] [place] [address] for evaluation. My notes for this consultation are attached. If you have questions, please do not hesitate to call me. I look forward to following your patient along with you.       Sincerely,    Sis Gonsalez MD

## 2022-08-01 ENCOUNTER — HOSPITAL ENCOUNTER (EMERGENCY)
Age: 57
Discharge: HOME | End: 2022-08-01
Attending: EMERGENCY MEDICINE
Payer: COMMERCIAL

## 2022-08-01 ENCOUNTER — APPOINTMENT (OUTPATIENT)
Dept: GENERAL RADIOLOGY | Age: 57
End: 2022-08-01
Attending: STUDENT IN AN ORGANIZED HEALTH CARE EDUCATION/TRAINING PROGRAM
Payer: COMMERCIAL

## 2022-08-01 VITALS
HEIGHT: 68 IN | HEART RATE: 63 BPM | RESPIRATION RATE: 18 BRPM | SYSTOLIC BLOOD PRESSURE: 158 MMHG | OXYGEN SATURATION: 96 % | DIASTOLIC BLOOD PRESSURE: 87 MMHG | BODY MASS INDEX: 31.98 KG/M2 | WEIGHT: 211 LBS | TEMPERATURE: 97.9 F

## 2022-08-01 DIAGNOSIS — S20.219A CONTUSION OF CHEST WALL, UNSPECIFIED LATERALITY, INITIAL ENCOUNTER: Primary | ICD-10-CM

## 2022-08-01 PROCEDURE — 73110 X-RAY EXAM OF WRIST: CPT

## 2022-08-01 PROCEDURE — 99283 EMERGENCY DEPT VISIT LOW MDM: CPT

## 2022-08-01 PROCEDURE — 73080 X-RAY EXAM OF ELBOW: CPT

## 2022-08-01 PROCEDURE — 73030 X-RAY EXAM OF SHOULDER: CPT

## 2022-08-01 NOTE — ED PROVIDER NOTES
EMERGENCY DEPARTMENT HISTORY AND PHYSICAL EXAM      Date: 8/1/2022  Patient Name: Ruy Lawrence Sr.    History of Presenting Illness     Chief Complaint   Patient presents with    Shoulder Injury       History Provided By: Patient    HPI: Felisha Newton, 64 y.o. male with a significant past medical history of diabetes presents to the ED with having tripped and fallen over a wire backwards and hitting his arm and his shoulder. Says he did not lose consciousness denies any other injuries at this time. Did not hit his neck and has no nausea, vomiting. He has not treated symptoms of mild pain with anything and is failed to improve. There are no other complaints, changes, or physical findings at this time. PCP: Aquilino Tran NP    No current facility-administered medications on file prior to encounter. Current Outpatient Medications on File Prior to Encounter   Medication Sig Dispense Refill    testosterone cypionate (DEPOTESTOTERONE CYPIONATE) 200 mg/mL injection INJECT 1 ML IN THE MUSCLE EVERY 14 DAYS( MAXIMUM DAILY AMOUNT IS 1 ML) 10 mL 0    metFORMIN (GLUCOPHAGE) 500 mg tablet TAKE 2 TABLETS BY MOUTH TWICE A DAY WITH MEALS 360 Tablet 1    rosuvastatin (CRESTOR) 5 mg tablet Take 1 Tablet by mouth daily. 90 Tablet 3    nitroglycerin (NITROSTAT) 0.3 mg SL tablet 1 Tablet by SubLINGual route every five (5) minutes as needed for Chest Pain. Up to 3 doses. 25 Tablet 0    metoprolol tartrate (LOPRESSOR) 100 mg IR tablet TAKE ONE TABLET BY MOUTH EVERY DAY 90 Tablet 3    gabapentin (NEURONTIN) 800 mg tablet TAKE ONE TABLET BY MOUTH THREE TIMES DAILY (max OF 2,400mg daily) 270 Tablet 1    omeprazole (PRILOSEC) 40 mg capsule Take 1 Capsule by mouth daily. 90 Capsule 1    hydroCHLOROthiazide (MICROZIDE) 12.5 mg capsule Take 1 Capsule by mouth daily. 90 Capsule 3    Stool Softener 100 mg capsule Take 100 mg by mouth daily.       albuterol (PROVENTIL HFA, VENTOLIN HFA, PROAIR HFA) 90 mcg/actuation inhaler Take 2 Puffs by inhalation every four (4) hours as needed for Wheezing. 1 Inhaler 0    syringe with needle (Syringe 3cc/63Wb7-8/2\") 3 mL 21 gauge x 1 1/2\" syrg For the administration of 1 mL testosterone every 2 weeks. Diagnosis code E29.1. 50 Pen Needle 0    buprenorphine-naloxone (SUBOXONE) 8-2 mg film sublingaul film three (3) times daily. Past History     Past Medical History:  Past Medical History:   Diagnosis Date    Acid reflux     Diabetes ([address]     Diabetic neuropathy (HCC)     Hypercholesterolemia     Hypertension     Restless leg syndrome        Past Surgical History:  Past Surgical History:   Procedure Laterality Date    HX ENDOSCOPY  02/01/2021    HX ROTATOR CUFF REPAIR  02/21/2022       Family History:  Family History   Problem Relation Age of Onset    Diabetes Mother     Heart Disease Mother     Heart Disease Father        Social History:  Social History     Tobacco Use    Smoking status: Every Day     Packs/day: 1.00     Types: Cigarettes    Smokeless tobacco: Never   Vaping Use    Vaping Use: Never used   Substance Use Topics    Alcohol use: Not Currently    Drug use: Not Currently       Allergies: Allergies   Allergen Reactions    Aspirin Other (comments)     GI Upset    Pcn [Penicillins] Unknown (comments)     Family reported. Review of Systems   Review of Systems   Constitutional: Negative. Negative for appetite change, chills, fatigue and fever. HENT: Negative. Negative for congestion and sinus pain. Eyes: Negative. Negative for pain and visual disturbance. Respiratory: Negative. Negative for chest tightness and shortness of breath. Cardiovascular: Negative. Negative for chest pain. Gastrointestinal: Negative. Negative for abdominal pain, diarrhea, nausea and vomiting. Genitourinary: Negative. Negative for difficulty urinating. No discharge   Musculoskeletal:  Positive for myalgias. Negative for arthralgias. Skin: Negative. Negative for rash. Neurological: Negative. Negative for weakness and headaches. Hematological: Negative. Psychiatric/Behavioral: Negative. Negative for agitation. The patient is not nervous/anxious. All other systems reviewed and are negative. Physical Exam   Physical Exam  Vitals and nursing note reviewed. Constitutional:       General: He is not in acute distress. Appearance: He is well-developed. HENT:      Head: Normocephalic and atraumatic. Nose: Nose normal.      Mouth/Throat:      Mouth: Mucous membranes are moist.      Pharynx: Oropharynx is clear. No oropharyngeal exudate. Eyes:      General:         Right eye: No discharge. Left eye: No discharge. Conjunctiva/sclera: Conjunctivae normal.      Pupils: Pupils are equal, round, and reactive to light. Cardiovascular:      Rate and Rhythm: Normal rate and regular rhythm. Chest Wall: PMI is not displaced. No thrill. Heart sounds: Normal heart sounds. No murmur heard. No friction rub. No gallop. Pulmonary:      Effort: Pulmonary effort is normal. No respiratory distress. Breath sounds: Normal breath sounds. No wheezing or rales. Chest:      Chest wall: No tenderness. Abdominal:      General: Bowel sounds are normal. There is no distension. Palpations: Abdomen is soft. There is no mass. Tenderness: There is no abdominal tenderness. There is no guarding or rebound. Musculoskeletal:         General: Normal range of motion. Cervical back: Normal range of motion and neck supple. Lymphadenopathy:      Cervical: No cervical adenopathy. Skin:     General: Skin is warm and dry. Capillary Refill: Capillary refill takes less than 2 seconds. Findings: No erythema or rash. Neurological:      Mental Status: He is alert and oriented to person, place, and time. Cranial Nerves: No cranial nerve deficit.       Coordination: Coordination normal.   Psychiatric:         Mood and Affect: Mood normal.         Behavior: Behavior normal.       Lab and Diagnostic Study Results   Labs -   No results found for this or any previous visit (from the past 12 hour(s)). Radiologic Studies -   @lastxrresult@  CT Results  (Last 48 hours)      None          CXR Results  (Last 48 hours)      None            Medical Decision Making and ED Course   - I am the first provider for this patient. I reviewed the vital signs, available nursing notes, past medical history, past surgical history, family history and social history. - Initial assessment performed. The patients presenting problems have been discussed, and they are in agreement with the care plan formulated and outlined with them. I have encouraged them to ask questions as they arise throughout their visit. Vital Signs-Reviewed the patient's vital signs. Patient Vitals for the past 12 hrs:   Temp Pulse Resp BP SpO2   08/01/22 1829 97.9 °F (36.6 °C) 63 18 (!) 158/87 96 %       Differential Diagnosis & Medical Decision Making Provider Note:   Will assess with x-rays and reevaluate. Patient defers anything for pain  MDM       ED Course:      X-rays are negative for any acute fracture  Procedures   Performed by: Sourav Andrade MD  Procedures      Disposition   Disposition: Condition stable  Discharged    DISCHARGE PLAN:  1. Current Discharge Medication List        CONTINUE these medications which have NOT CHANGED    Details   testosterone cypionate (DEPOTESTOTERONE CYPIONATE) 200 mg/mL injection INJECT 1 ML IN THE MUSCLE EVERY 14 DAYS( MAXIMUM DAILY AMOUNT IS 1 ML)  Qty: 10 mL, Refills: 0    Associated Diagnoses: Male hypogonadism      metFORMIN (GLUCOPHAGE) 500 mg tablet TAKE 2 TABLETS BY MOUTH TWICE A DAY WITH MEALS  Qty: 360 Tablet, Refills: 1    Associated Diagnoses: Type 2 diabetes mellitus with diabetic neuropathy, without long-term current use of insulin (HCC)      rosuvastatin (CRESTOR) 5 mg tablet Take 1 Tablet by mouth daily.   Qty: 90 Tablet, Refills: 3    Associated Diagnoses: Hypercholesterolemia      nitroglycerin (NITROSTAT) 0.3 mg SL tablet 1 Tablet by SubLINGual route every five (5) minutes as needed for Chest Pain. Up to 3 doses. Qty: 25 Tablet, Refills: 0    Associated Diagnoses: Atypical chest pain      metoprolol tartrate (LOPRESSOR) 100 mg IR tablet TAKE ONE TABLET BY MOUTH EVERY DAY  Qty: 90 Tablet, Refills: 3    Associated Diagnoses: Essential hypertension      gabapentin (NEURONTIN) 800 mg tablet TAKE ONE TABLET BY MOUTH THREE TIMES DAILY (max OF 2,400mg daily)  Qty: 270 Tablet, Refills: 1    Associated Diagnoses: Carpal tunnel syndrome, unspecified laterality      omeprazole (PRILOSEC) 40 mg capsule Take 1 Capsule by mouth daily. Qty: 90 Capsule, Refills: 1    Associated Diagnoses: Gastroesophageal reflux disease, unspecified whether esophagitis present      hydroCHLOROthiazide (MICROZIDE) 12.5 mg capsule Take 1 Capsule by mouth daily. Qty: 90 Capsule, Refills: 3    Associated Diagnoses: Essential hypertension      Stool Softener 100 mg capsule Take 100 mg by mouth daily. albuterol (PROVENTIL HFA, VENTOLIN HFA, PROAIR HFA) 90 mcg/actuation inhaler Take 2 Puffs by inhalation every four (4) hours as needed for Wheezing. Qty: 1 Inhaler, Refills: 0      syringe with needle (Syringe 3cc/52Qq4-2/2\") 3 mL 21 gauge x 1 1/2\" syrg For the administration of 1 mL testosterone every 2 weeks. Diagnosis code E29.1. Qty: 50 Pen Needle, Refills: 0    Associated Diagnoses: Male hypogonadism      buprenorphine-naloxone (SUBOXONE) 8-2 mg film sublingaul film three (3) times daily. Comments: <!--EPICS-->.<!--EPICE-->           2. Follow-up Information       Follow up With Specialties Details Why Contact Info    Vivian Barksdale NP Nurse Practitioner Call in 2 days As needed, If symptoms worsen [address]  [address] [address] [phone]            3. Return to ED if worse   4.    Current Discharge Medication List Remove if admitted/transferred    Diagnosis/Clinical Impression     Clinical Impression:   1. Contusion of chest wall, unspecified laterality, initial encounter        Attestations: Monica Justice MD, am the primary clinician of record. Please note that this dictation was completed with [employer], the computer voice recognition software. Quite often unanticipated grammatical, syntax, homophones, and other interpretive errors are inadvertently transcribed by the computer software. Please disregard these errors. Please excuse any errors that have escaped final proofreading. Thank you.

## 2022-08-01 NOTE — ED TRIAGE NOTES
Patient fell onto his left side in a well house takes suboxone says hes not here for pain meds, just wants to check his left arm out pain in shoulder down to wrist

## 2022-08-11 ENCOUNTER — OFFICE VISIT (OUTPATIENT)
Dept: ORTHOPEDIC SURGERY | Age: 57
End: 2022-08-11
Payer: COMMERCIAL

## 2022-08-11 DIAGNOSIS — S46.012A TRAUMATIC COMPLETE TEAR OF LEFT ROTATOR CUFF, INITIAL ENCOUNTER: ICD-10-CM

## 2022-08-11 DIAGNOSIS — M25.512 CHRONIC LEFT SHOULDER PAIN: ICD-10-CM

## 2022-08-11 DIAGNOSIS — M17.11 PRIMARY OSTEOARTHRITIS OF RIGHT KNEE: ICD-10-CM

## 2022-08-11 DIAGNOSIS — G89.29 CHRONIC LEFT SHOULDER PAIN: ICD-10-CM

## 2022-08-11 DIAGNOSIS — G89.29 CHRONIC PAIN OF RIGHT KNEE: Primary | ICD-10-CM

## 2022-08-11 DIAGNOSIS — M75.42 IMPINGEMENT SYNDROME OF LEFT SHOULDER: ICD-10-CM

## 2022-08-11 DIAGNOSIS — M25.561 CHRONIC PAIN OF RIGHT KNEE: Primary | ICD-10-CM

## 2022-08-11 DIAGNOSIS — M19.012 ARTHRITIS OF LEFT ACROMIOCLAVICULAR JOINT: ICD-10-CM

## 2022-08-11 PROCEDURE — 99214 OFFICE O/P EST MOD 30 MIN: CPT | Performed by: ORTHOPAEDIC SURGERY

## 2022-08-11 PROCEDURE — 20610 DRAIN/INJ JOINT/BURSA W/O US: CPT | Performed by: ORTHOPAEDIC SURGERY

## 2022-08-12 NOTE — PROGRESS NOTES
Osmin Alexander Sr. (: 1965) is a 64 y.o. male, patient, here for evaluation of the following chief complaint(s):  Shoulder Pain and Knee Pain (Left shoulder and right knee)       HPI:    He states that he has had a several week history of left shoulder pain. He states that he was working at home and has a well which is bordered by cinderblocks and he lost his footing and fell on his left shoulder. We had done a right rotator cuff repair within the last year and he states that is doing very nicely but now having left shoulder pain. He states that he has a 20+ year history of right knee pain for which he gets a cortisone shot every couple years because of pain and swelling. He states his shoulder pain is moderate to severe and his knee pain is annoying and significantly uncomfortable. Allergies   Allergen Reactions    Aspirin Other (comments)     GI Upset    Pcn [Penicillins] Unknown (comments)     Family reported. Current Outpatient Medications   Medication Sig    testosterone cypionate (DEPOTESTOTERONE CYPIONATE) 200 mg/mL injection INJECT 1 ML IN THE MUSCLE EVERY 14 DAYS( MAXIMUM DAILY AMOUNT IS 1 ML)    metFORMIN (GLUCOPHAGE) 500 mg tablet TAKE 2 TABLETS BY MOUTH TWICE A DAY WITH MEALS    rosuvastatin (CRESTOR) 5 mg tablet Take 1 Tablet by mouth daily. nitroglycerin (NITROSTAT) 0.3 mg SL tablet 1 Tablet by SubLINGual route every five (5) minutes as needed for Chest Pain. Up to 3 doses. metoprolol tartrate (LOPRESSOR) 100 mg IR tablet TAKE ONE TABLET BY MOUTH EVERY DAY    gabapentin (NEURONTIN) 800 mg tablet TAKE ONE TABLET BY MOUTH THREE TIMES DAILY (max OF 2,400mg daily)    omeprazole (PRILOSEC) 40 mg capsule Take 1 Capsule by mouth daily. hydroCHLOROthiazide (MICROZIDE) 12.5 mg capsule Take 1 Capsule by mouth daily. Stool Softener 100 mg capsule Take 100 mg by mouth daily.     albuterol (PROVENTIL HFA, VENTOLIN HFA, PROAIR HFA) 90 mcg/actuation inhaler Take 2 Puffs by inhalation every four (4) hours as needed for Wheezing. syringe with needle (Syringe 3cc/23To7-0/2\") 3 mL 21 gauge x 1 1/2\" syrg For the administration of 1 mL testosterone every 2 weeks. Diagnosis code E29.1.    buprenorphine-naloxone (SUBOXONE) 8-2 mg film sublingaul film three (3) times daily. No current facility-administered medications for this visit. Past Medical History:   Diagnosis Date    Acid reflux     Diabetes (HCC)     Diabetic neuropathy (HCC)     Hypercholesterolemia     Hypertension     Restless leg syndrome         Past Surgical History:   Procedure Laterality Date    HX ENDOSCOPY  02/01/2021    HX ROTATOR CUFF REPAIR  02/21/2022       Family History   Problem Relation Age of Onset    Diabetes Mother     Heart Disease Mother     Heart Disease Father         Social History     Socioeconomic History    Marital status:      Spouse name: Not on file    Number of children: Not on file    Years of education: Not on file    Highest education level: Not on file   Occupational History    Not on file   Tobacco Use    Smoking status: Every Day     Packs/day: 1.00     Types: Cigarettes    Smokeless tobacco: Never   Vaping Use    Vaping Use: Never used   Substance and Sexual Activity    Alcohol use: Not Currently    Drug use: Not Currently    Sexual activity: Yes   Other Topics Concern    Not on file   Social History Narrative    Not on file     Social Determinants of Health     Financial Resource Strain: Not on file   Food Insecurity: Not on file   Transportation Needs: Not on file   Physical Activity: Not on file   Stress: Not on file   Social Connections: Not on file   Intimate Partner Violence: Not on file   Housing Stability: Not on file       Review of Systems   All other systems reviewed and are negative. Vitals: There were no vitals taken for this visit. There is no height or weight on file to calculate BMI. Ortho Exam     The patient is well-developed and well-nourished.   The patient presents today in alert and oriented x3 with a normal mood and affect. The patient stands with a normal weightbearing line as a slightly antalgic gait because of his right knee pain. Right knee is tender to palpation along the medial joint line, and has a moderate effusion. They are nontender to palpation along the medial and lateral facets of the patella. The patient has no discomfort with Ruma's maneuvers, and the knee is stable. They have limited range of motion. They have 5/5 strength, and are neurovascularly intact distally. There is no erythema, warmth or skin lesions present. Left shoulder: No shoulder girdle atrophy. There is no soft tissue swelling, ecchymosis, abrasions, or lacerations. Active range of motion of the shoulder is limited to 130 degrees of forward flexion, 120 degrees of lateral abduction, and 70 degrees of external rotation. Internal rotation is to the SI joint and is painful. Passive range of motion is full with a painful impingement sign and painful Dobbs sign. Rotator cuff strength is painful to evaluate and is a 4+/5 with forward flexion and lateral abduction. External rotation strength is maintained. There is no crepitation about the joint. Palpation of the [facility] joint does reproduce discomfort and there is pain elicited with cross body abduction. Strength of the extremity is 5/5 strength at biceps/triceps/wrist extension and is comparable to the contralateral side. ASSESSMENT/PLAN:    XR Results (most recent):  Results from Appointment encounter on 08/11/22    XR KNEE RT MIN 4 V    Narrative  4 view x-rays of the right knee shows no evidence of a fracture or dislocation. There is significant medial and some patellofemoral joint space narrowing. 1. Chronic pain of right knee  -     XR KNEE RT MIN 4 V; Future  2.  Primary osteoarthritis of right knee  -     bupivacaine (PF) (MARCAINE) 0.75 % (7.5 mg/mL) injection 15 mg; 15 mg (2 mL), Intra artICUlar, ONCE, 1 dose, On Mon 8/15/22 at 0900  -     triamcinolone acetonide (KENALOG-40) 40 mg/mL injection 80 mg; 80 mg, Intra artICUlar, ONCE, 1 dose, On Mon 8/15/22 at 0900  3. Chronic left shoulder pain  -     MRI SHOULDER LT WO CONT; Future  4. Traumatic complete tear of left rotator cuff, initial encounter  5. Impingement syndrome of left shoulder  6. Arthritis of left acromioclavicular joint     XR Results (most recent):  Results from Appointment encounter on 08/11/22    XR KNEE RT MIN 4 V    Narrative  4 view x-rays of the right knee shows no evidence of a fracture or dislocation. There is significant medial and some patellofemoral joint space narrowing. Below is the assessment and plan developed based on review of pertinent history, physical exam, labs, studies, and medications. We reviewed his previous outside x-rays of his left shoulder as well as the radiologist report which shows impingement, AC joint arthrosis and calcific tendinitis. I think he has also had acute rupture of his left rotator cuff or more likely an acute exacerbation of an already existing rotator cuff tear. We talked about the severity of his pain and in light of his previous right rotator cuff tear I expect that he has a left rotator cuff tear at this point. We will proceed with an MRI of his left shoulder and we did discuss treatment options. He is very aware of the treatment as he has recently had a right rotator cuff repair. I will see him back after his left shoulder MRI is complete to discuss the images, results, and further treatment options. We also discussed the x-rays on his right knee suggestive of medial and patellofemoral arthrosis. There is also an effusion present. After long discussion of treatment options, because of an effusion present we elected to aspirate and inject his right knee with cortisone today to try and alleviate some of his discomfort.   The risks and benefits of the aspiration and injection were discussed in detail with the patient and under sterile prep the patient's right knee was aspirated of approximately 25 ccs of clear synovial fluid and injected with 2 ccs of 40 mg/cc of Kenalog and 2 cc of 0.75% Sensorcaine. He tolerated both the aspiration and injection well. I did encourage him to continue to ice and elevate when possible, modify his activity level based on his right knee pain, monitor his effusion, and use anti-inflammatory medication when necessary. The patient will also work on range of motion, strengthening, and stretching exercises with an at-home exercise program as pain tolerates. He is to avoid any deep knee bend activities against resistance, squatting, kneeling, stairs, lunging, and high impact loading activities. I will see him back on an as-needed basis for his right knee pain. **We will obtain an MRI for more information to determine the best treatment plan moving forward and help us prepare for surgical intervention if necessary. **    Return for After his left shoulder MRI is complete. An electronic signature was used to authenticate this note.   -- Chris Leigh MD

## 2022-08-15 RX ORDER — BUPIVACAINE HYDROCHLORIDE 7.5 MG/ML
2 INJECTION, SOLUTION EPIDURAL; RETROBULBAR ONCE
Status: COMPLETED | OUTPATIENT
Start: 2022-08-15 | End: 2022-08-15

## 2022-08-15 RX ORDER — TRIAMCINOLONE ACETONIDE 40 MG/ML
80 INJECTION, SUSPENSION INTRA-ARTICULAR; INTRAMUSCULAR ONCE
Status: COMPLETED | OUTPATIENT
Start: 2022-08-15 | End: 2022-08-15

## 2022-08-15 RX ADMIN — BUPIVACAINE HYDROCHLORIDE 15 MG: 7.5 INJECTION, SOLUTION EPIDURAL; RETROBULBAR at 08:27

## 2022-08-15 RX ADMIN — TRIAMCINOLONE ACETONIDE 80 MG: 40 INJECTION, SUSPENSION INTRA-ARTICULAR; INTRAMUSCULAR at 08:27

## 2022-09-13 ENCOUNTER — HOSPITAL ENCOUNTER (OUTPATIENT)
Dept: MRI IMAGING | Age: 57
Discharge: HOME | End: 2022-09-13
Payer: COMMERCIAL

## 2022-09-13 DIAGNOSIS — G89.29 CHRONIC LEFT SHOULDER PAIN: ICD-10-CM

## 2022-09-13 DIAGNOSIS — M25.512 CHRONIC LEFT SHOULDER PAIN: ICD-10-CM

## 2022-09-13 PROCEDURE — 73221 MRI JOINT UPR EXTREM W/O DYE: CPT

## 2022-09-19 ENCOUNTER — OFFICE VISIT (OUTPATIENT)
Dept: ORTHOPEDIC SURGERY | Age: 57
End: 2022-09-19
Payer: COMMERCIAL

## 2022-09-19 VITALS — BODY MASS INDEX: 31.83 KG/M2 | HEIGHT: 68 IN | WEIGHT: 210 LBS

## 2022-09-19 DIAGNOSIS — M19.012 ARTHRITIS OF LEFT ACROMIOCLAVICULAR JOINT: ICD-10-CM

## 2022-09-19 DIAGNOSIS — M75.42 SHOULDER IMPINGEMENT, LEFT: ICD-10-CM

## 2022-09-19 DIAGNOSIS — G89.29 CHRONIC LEFT SHOULDER PAIN: Primary | ICD-10-CM

## 2022-09-19 DIAGNOSIS — M75.122 NONTRAUMATIC COMPLETE TEAR OF LEFT ROTATOR CUFF: ICD-10-CM

## 2022-09-19 DIAGNOSIS — M67.814 BICEPS TENDINOSIS OF LEFT SHOULDER: ICD-10-CM

## 2022-09-19 DIAGNOSIS — M25.512 CHRONIC LEFT SHOULDER PAIN: Primary | ICD-10-CM

## 2022-09-19 DIAGNOSIS — M75.32 CALCIFIC TENDINITIS OF LEFT SHOULDER: ICD-10-CM

## 2022-09-19 PROCEDURE — 99214 OFFICE O/P EST MOD 30 MIN: CPT | Performed by: ORTHOPAEDIC SURGERY

## 2022-09-19 NOTE — PROGRESS NOTES
Jeanne Almeida Sr. (: 1965) is a 64 y.o. male, patient, here for evaluation of the following chief complaint(s):  Shoulder Pain (Left, mri results)       HPI:    He was last seen for left shoulder pain on 2022. Since then, the patient did have an MRI performed on his left shoulder on 2022. The patient states that his pain level is the same as it was at his last visit. He gives no detail or description of his discomfort. Left shoulder MRI images and results were independently reviewed and they were consistent with an irregular full-thickness tear of the supraspinatus tendon. No retraction is noted. Large full-thickness tear of the subscapularis tendon. 2 cm of retraction noted. Moderate biceps tendinosis. Mild infraspinatus calcific tendinosis. Acromioclavicular osteophytes may cause subacromial impingement. Allergies   Allergen Reactions    Aspirin Other (comments)     GI Upset    Pcn [Penicillins] Unknown (comments)     Family reported. Current Outpatient Medications   Medication Sig    testosterone cypionate (DEPOTESTOTERONE CYPIONATE) 200 mg/mL injection INJECT 1 ML IN THE MUSCLE EVERY 14 DAYS( MAXIMUM DAILY AMOUNT IS 1 ML)    metFORMIN (GLUCOPHAGE) 500 mg tablet TAKE 2 TABLETS BY MOUTH TWICE A DAY WITH MEALS    rosuvastatin (CRESTOR) 5 mg tablet Take 1 Tablet by mouth daily. nitroglycerin (NITROSTAT) 0.3 mg SL tablet 1 Tablet by SubLINGual route every five (5) minutes as needed for Chest Pain. Up to 3 doses. metoprolol tartrate (LOPRESSOR) 100 mg IR tablet TAKE ONE TABLET BY MOUTH EVERY DAY    gabapentin (NEURONTIN) 800 mg tablet TAKE ONE TABLET BY MOUTH THREE TIMES DAILY (max OF 2,400mg daily)    omeprazole (PRILOSEC) 40 mg capsule Take 1 Capsule by mouth daily. hydroCHLOROthiazide (MICROZIDE) 12.5 mg capsule Take 1 Capsule by mouth daily. Stool Softener 100 mg capsule Take 100 mg by mouth daily.     albuterol (PROVENTIL HFA, VENTOLIN HFA, PROAIR HFA) 90 mcg/actuation inhaler Take 2 Puffs by inhalation every four (4) hours as needed for Wheezing. syringe with needle (Syringe 3cc/30Oz7-4/2\") 3 mL 21 gauge x 1 1/2\" syrg For the administration of 1 mL testosterone every 2 weeks. Diagnosis code E29.1.    buprenorphine-naloxone (SUBOXONE) 8-2 mg film sublingaul film three (3) times daily. No current facility-administered medications for this visit. Past Medical History:   Diagnosis Date    Acid reflux     Diabetes (HCC)     Diabetic neuropathy (HCC)     Hypercholesterolemia     Hypertension     Restless leg syndrome         Past Surgical History:   Procedure Laterality Date    HX ENDOSCOPY  02/01/2021    HX ROTATOR CUFF REPAIR  02/21/2022       Family History   Problem Relation Age of Onset    Diabetes Mother     Heart Disease Mother     Heart Disease Father         Social History     Socioeconomic History    Marital status:      Spouse name: Not on file    Number of children: Not on file    Years of education: Not on file    Highest education level: Not on file   Occupational History    Not on file   Tobacco Use    Smoking status: Every Day     Packs/day: 1.00     Types: Cigarettes    Smokeless tobacco: Never   Vaping Use    Vaping Use: Never used   Substance and Sexual Activity    Alcohol use: Not Currently    Drug use: Not Currently    Sexual activity: Yes   Other Topics Concern    Not on file   Social History Narrative    Not on file     Social Determinants of Health     Financial Resource Strain: Not on file   Food Insecurity: Not on file   Transportation Needs: Not on file   Physical Activity: Not on file   Stress: Not on file   Social Connections: Not on file   Intimate Partner Violence: Not on file   Housing Stability: Not on file       Review of Systems   All other systems reviewed and are negative. Vitals:  Ht 5' 8\" (1.727 m)   Wt 210 lb (95.3 kg)   BMI 31.93 kg/m²    Body mass index is 31.93 kg/m².     Ortho Exam     The patient is well-developed and well-nourished. The patient presents today in alert and oriented x3 with a normal mood and affect. The patient stands with a normal weightbearing line and walks with a normal gait. Left shoulder: No shoulder girdle atrophy. There is no soft tissue swelling, ecchymosis, abrasions, or lacerations. Active range of motion of the shoulder is limited to 130 degrees of forward flexion, 120 degrees of lateral abduction, and 70 degrees of external rotation. Internal rotation is to the SI joint and is painful. Passive range of motion is full with a painful impingement sign and painful Dobbs sign. Rotator cuff strength is painful to evaluate and is a 4+/5 with forward flexion and lateral abduction. External rotation strength is maintained. There is no crepitation about the joint. Palpation of the [facility] joint does reproduce discomfort and there is pain elicited with cross body abduction. Strength of the extremity is 5/5 strength at biceps/triceps/wrist extension and is comparable to the contralateral side. ASSESSMENT/PLAN:      1. Chronic left shoulder pain  2. Arthritis of left acromioclavicular joint  3. Nontraumatic complete tear of left rotator cuff  4. Shoulder impingement, left  5. Biceps tendinosis of left shoulder  6. Calcific tendinitis of left shoulder     Below is the assessment and plan developed based on review of pertinent history, physical exam, labs, studies, and medications. We discussed the patient's ongoing left shoulder pain and we independently reviewed his MRI images and results and they were consistent with an irregular full-thickness tear of the supraspinatus tendon. No retraction is noted. Large full-thickness tear of the subscapularis tendon. 2 cm of retraction noted. Moderate biceps tendinosis. Mild infraspinatus calcific tendinosis. Acromioclavicular osteophytes may cause subacromial impingement.   The possible treatment options were discussed with the patient and because of the duration of his increased pain, no improvement with multiple modalities of conservative management including an at-home exercise program, his physical exam, description of his pain, past x-rays, independently reviewed MRI images and results, and his inability to complete daily living activities without significant discomfort we both decided that surgical intervention was the best treatment plan. The risks and benefits of a left shoulder arthroscopic exam rotator cuff repair, acromioplasty, distal clavicle resection, extensive debridement, and open biceps tenodesis were discussed in detail with the patient and he would like to proceed. We will schedule this at his convenience. In the interim, I did encourage him to ice when possible, modify his activity level based on his left shoulder pain, and use anti-inflammatory medication when necessary. The patient will work on range of motion, strengthening, and stretching exercises with an at-home exercise program as pain tolerates. I will see him back in the office on an as-needed basis or on the day of his upcoming left shoulder surgery. Return for In the office as needed or on the day of his upcoming left shoulder surgery. An electronic signature was used to authenticate this note.   -- Erika Nuñez MD

## 2022-12-22 DIAGNOSIS — I10 ESSENTIAL HYPERTENSION: ICD-10-CM

## 2022-12-22 RX ORDER — METOPROLOL TARTRATE 100 MG/1
TABLET ORAL
Qty: 90 TABLET | Refills: 1 | Status: SHIPPED | OUTPATIENT
Start: 2022-12-22

## 2023-02-24 DIAGNOSIS — E29.1 MALE HYPOGONADISM: ICD-10-CM

## 2023-02-24 RX ORDER — TESTOSTERONE CYPIONATE 200 MG/ML
INJECTION, SOLUTION INTRAMUSCULAR
Qty: 2 ML | Refills: 0 | Status: SHIPPED | OUTPATIENT
Start: 2023-02-24

## 2023-02-24 NOTE — TELEPHONE ENCOUNTER
Pts wife called in and is wanting to know if you would be able to refill this one more time? Also wanting to know if you are going to another family practice so pt can follow you?

## 2023-05-24 RX ORDER — HYDROCHLOROTHIAZIDE 12.5 MG/1
12.5 CAPSULE, GELATIN COATED ORAL DAILY
COMMUNITY
Start: 2022-01-19

## 2023-05-24 RX ORDER — PSEUDOEPHEDRINE HCL 30 MG
100 TABLET ORAL DAILY
COMMUNITY
Start: 2021-09-20

## 2023-05-24 RX ORDER — BUPRENORPHINE AND NALOXONE 8; 2 MG/1; MG/1
FILM, SOLUBLE BUCCAL; SUBLINGUAL 3 TIMES DAILY
COMMUNITY
Start: 2019-11-19

## 2023-05-24 RX ORDER — OMEPRAZOLE 40 MG/1
40 CAPSULE, DELAYED RELEASE ORAL DAILY
COMMUNITY
Start: 2022-01-20

## 2023-05-24 RX ORDER — METOPROLOL TARTRATE 100 MG/1
1 TABLET ORAL DAILY
COMMUNITY
Start: 2022-12-22

## 2023-05-24 RX ORDER — TESTOSTERONE CYPIONATE 200 MG/ML
INJECTION, SOLUTION INTRAMUSCULAR
COMMUNITY
Start: 2023-02-24

## 2023-05-24 RX ORDER — NITROGLYCERIN 0.3 MG/1
0.3 TABLET SUBLINGUAL
COMMUNITY
Start: 2022-04-08

## 2023-05-24 RX ORDER — GABAPENTIN 800 MG/1
TABLET ORAL
COMMUNITY
Start: 2022-01-20

## 2023-05-24 RX ORDER — ALBUTEROL SULFATE 90 UG/1
2 AEROSOL, METERED RESPIRATORY (INHALATION) EVERY 4 HOURS PRN
COMMUNITY
Start: 2021-06-30

## 2023-05-24 RX ORDER — ROSUVASTATIN CALCIUM 5 MG/1
5 TABLET, COATED ORAL DAILY
COMMUNITY
Start: 2022-04-26

## 2024-02-16 ENCOUNTER — HOSPITAL ENCOUNTER (EMERGENCY)
Facility: HOSPITAL | Age: 59
Discharge: HOME | End: 2024-02-16
Attending: STUDENT IN AN ORGANIZED HEALTH CARE EDUCATION/TRAINING PROGRAM
Payer: COMMERCIAL

## 2024-02-16 ENCOUNTER — APPOINTMENT (OUTPATIENT)
Facility: HOSPITAL | Age: 59
End: 2024-02-16
Payer: COMMERCIAL

## 2024-02-16 VITALS
HEIGHT: 68 IN | WEIGHT: 195 LBS | SYSTOLIC BLOOD PRESSURE: 162 MMHG | OXYGEN SATURATION: 99 % | RESPIRATION RATE: 18 BRPM | BODY MASS INDEX: 29.55 KG/M2 | HEART RATE: 68 BPM | DIASTOLIC BLOOD PRESSURE: 89 MMHG | TEMPERATURE: 98 F

## 2024-02-16 DIAGNOSIS — R07.81 RIB PAIN ON RIGHT SIDE: Primary | ICD-10-CM

## 2024-02-16 PROCEDURE — 71100 X-RAY EXAM RIBS UNI 2 VIEWS: CPT

## 2024-02-16 PROCEDURE — 6360000002 HC RX W HCPCS: Performed by: STUDENT IN AN ORGANIZED HEALTH CARE EDUCATION/TRAINING PROGRAM

## 2024-02-16 PROCEDURE — 96372 THER/PROPH/DIAG INJ SC/IM: CPT

## 2024-02-16 PROCEDURE — 6370000000 HC RX 637 (ALT 250 FOR IP): Performed by: STUDENT IN AN ORGANIZED HEALTH CARE EDUCATION/TRAINING PROGRAM

## 2024-02-16 PROCEDURE — 99284 EMERGENCY DEPT VISIT MOD MDM: CPT

## 2024-02-16 RX ORDER — ACETAMINOPHEN 500 MG
1000 TABLET ORAL EVERY 6 HOURS PRN
Qty: 40 TABLET | Refills: 0 | Status: SHIPPED | OUTPATIENT
Start: 2024-02-16

## 2024-02-16 RX ORDER — ACETAMINOPHEN 500 MG
1000 TABLET ORAL
Status: COMPLETED | OUTPATIENT
Start: 2024-02-16 | End: 2024-02-16

## 2024-02-16 RX ORDER — LIDOCAINE 4 G/G
1 PATCH TOPICAL
Status: DISCONTINUED | OUTPATIENT
Start: 2024-02-16 | End: 2024-02-16 | Stop reason: HOSPADM

## 2024-02-16 RX ORDER — KETOROLAC TROMETHAMINE 10 MG/1
10 TABLET, FILM COATED ORAL EVERY 6 HOURS PRN
Qty: 20 TABLET | Refills: 0 | Status: SHIPPED | OUTPATIENT
Start: 2024-02-16

## 2024-02-16 RX ORDER — METHOCARBAMOL 1000 MG/1
1000 TABLET, COATED ORAL 4 TIMES DAILY PRN
Qty: 20 TABLET | Refills: 0 | Status: SHIPPED | OUTPATIENT
Start: 2024-02-16 | End: 2024-02-21

## 2024-02-16 RX ORDER — LIDOCAINE 50 MG/G
1 PATCH TOPICAL DAILY
Qty: 5 PATCH | Refills: 0 | Status: SHIPPED | OUTPATIENT
Start: 2024-02-16 | End: 2024-02-21

## 2024-02-16 RX ORDER — METHOCARBAMOL 500 MG/1
1000 TABLET, FILM COATED ORAL ONCE
Status: COMPLETED | OUTPATIENT
Start: 2024-02-16 | End: 2024-02-16

## 2024-02-16 RX ORDER — KETOROLAC TROMETHAMINE 30 MG/ML
30 INJECTION, SOLUTION INTRAMUSCULAR; INTRAVENOUS
Status: COMPLETED | OUTPATIENT
Start: 2024-02-16 | End: 2024-02-16

## 2024-02-16 RX ADMIN — ACETAMINOPHEN 1000 MG: 500 TABLET ORAL at 20:42

## 2024-02-16 RX ADMIN — METHOCARBAMOL 1000 MG: 500 TABLET ORAL at 20:42

## 2024-02-16 RX ADMIN — KETOROLAC TROMETHAMINE 30 MG: 30 INJECTION, SOLUTION INTRAMUSCULAR; INTRAVENOUS at 20:43

## 2024-02-16 ASSESSMENT — PAIN - FUNCTIONAL ASSESSMENT
PAIN_FUNCTIONAL_ASSESSMENT: 0-10
PAIN_FUNCTIONAL_ASSESSMENT: 0-10

## 2024-02-16 ASSESSMENT — PAIN DESCRIPTION - ORIENTATION: ORIENTATION: RIGHT;LOWER

## 2024-02-16 ASSESSMENT — PAIN SCALES - GENERAL
PAINLEVEL_OUTOF10: 6
PAINLEVEL_OUTOF10: 10

## 2024-02-16 ASSESSMENT — PAIN DESCRIPTION - LOCATION: LOCATION: CHEST

## 2024-02-17 NOTE — ED PROVIDER NOTES
[facility] EMERGENCY DEPT  EMERGENCY DEPARTMENT HISTORY AND PHYSICAL EXAM      Date: 2/16/2024  Patient Name: Wilmar Ascencio Sr.  MRN: [id number]  Birthdate 1965  Date of evaluation: 2/16/2024  Provider: Constantin Millard MD   Note Started: 8:28 PM EST 2/16/24    HISTORY OF PRESENT ILLNESS     Chief Complaint   Patient presents with    Rib Pain (injury)       History Provided By: Patient    HPI: Wilmar Ascencio Sr. is a 58 y.o. male with past medical history of DM, HTN, HLD, and GERD who comes to the ED with pain in the right lower rib area.  He report that he fell in [place] on Tuesday evening and since then has been having pain at the right ribs.  Every time he takes a deep breath or coughs it hurts.  He is currently dealing with an upper respiratory viral infection with runny nose, nasal congestion and coughing.  No fever.  He is reporting shortness of breath when the pain intensifies but no shortness of breath at rest.  Denies any other injuries or any other concerns.    PAST MEDICAL HISTORY   Past Medical History:  Past Medical History:   Diagnosis Date    Acid reflux     Diabetes (HCC)     Diabetic neuropathy (HCC)     Hypercholesterolemia     Hypertension     Restless leg syndrome        Past Surgical History:  Past Surgical History:   Procedure Laterality Date    ROTATOR CUFF REPAIR  02/21/2022    UPPER GASTROINTESTINAL ENDOSCOPY  02/01/2021       Family History:  Family History   Problem Relation Age of Onset    Diabetes Mother     Heart Disease Mother     Heart Disease Father        Social History:  Social History     Tobacco Use    Smoking status: Every Day     Current packs/day: 1.00     Types: Cigarettes    Smokeless tobacco: Never   Substance Use Topics    Alcohol use: Not Currently    Drug use: Not Currently       Allergies:  Allergies   Allergen Reactions    Aspirin Other (See Comments)     GI Upset    Penicillins      Other reaction(s): Unknown (comments)  Family reported.       PCP: Kathleen Pabon

## 2024-02-17 NOTE — DISCHARGE INSTRUCTIONS
Thank you!  Thank you for allowing me to care for you in the emergency department. It is my goal to provide you with excellent care.  Please fill out the survey that will come to you by mail or email since we listen to your feedback!     Below you will find a list of your tests from today's visit.  Should you have any questions, please do not hesitate to call the emergency department.    Labs  No results found for this or any previous visit (from the past 12 hour(s)).    Radiologic Studies  XR RIBS RIGHT (2 VIEWS)   Final Result   No displaced rib fracture or pneumothorax. No change given difference in   technique.               ------------------------------------------------------------------------------------------------------------  The exam and treatment you received in the Emergency Department were for an urgent problem and are not intended as complete care. It is important that you follow-up with a doctor, nurse practitioner, or physician assistant to:  (1) confirm your diagnosis,  (2) re-evaluation of changes in your illness and treatment, and (3) for ongoing care. Please take your discharge instructions with you when you go to your follow-up appointment.     If you have any problem arranging a follow-up appointment, contact the Emergency Department.  If your symptoms become worse or you do not improve as expected and you are unable to reach your health care provider, please return to the Emergency Department. We are available 24 hours a day.     If a prescription has been provided, please have it filled as soon as possible to prevent a delay in treatment. If you have any questions or reservations about taking the medication due to side effects or interactions with other medications, please call your primary care provider or contact the ER.

## 2024-02-17 NOTE — ED TRIAGE NOTES
Pt presents for right rib cage pain after falling onto a motor on Tuesday evening, right lower ribs are tender to touch and pain is worse with deep breathes. Pt also getting over a cold